# Patient Record
Sex: MALE | Race: WHITE | NOT HISPANIC OR LATINO | Employment: OTHER | URBAN - METROPOLITAN AREA
[De-identification: names, ages, dates, MRNs, and addresses within clinical notes are randomized per-mention and may not be internally consistent; named-entity substitution may affect disease eponyms.]

---

## 2017-01-27 ENCOUNTER — OFFICE VISIT (OUTPATIENT)
Dept: FAMILY MEDICINE CLINIC | Facility: CLINIC | Age: 52
End: 2017-01-27

## 2017-01-27 VITALS
HEART RATE: 74 BPM | HEIGHT: 73 IN | WEIGHT: 219 LBS | RESPIRATION RATE: 20 BRPM | DIASTOLIC BLOOD PRESSURE: 88 MMHG | BODY MASS INDEX: 29.03 KG/M2 | TEMPERATURE: 98.1 F | SYSTOLIC BLOOD PRESSURE: 134 MMHG | OXYGEN SATURATION: 99 %

## 2017-01-27 DIAGNOSIS — J02.9 SORE THROAT: Primary | ICD-10-CM

## 2017-01-27 DIAGNOSIS — J40 BRONCHITIS: ICD-10-CM

## 2017-01-27 DIAGNOSIS — J45.20 MILD INTERMITTENT ASTHMA WITHOUT COMPLICATION: ICD-10-CM

## 2017-01-27 LAB
EXPIRATION DATE: NORMAL
INTERNAL CONTROL: NORMAL
Lab: NORMAL
S PYO AG THROAT QL: NEGATIVE

## 2017-01-27 PROCEDURE — 87880 STREP A ASSAY W/OPTIC: CPT | Performed by: PHYSICIAN ASSISTANT

## 2017-01-27 PROCEDURE — 99213 OFFICE O/P EST LOW 20 MIN: CPT | Performed by: PHYSICIAN ASSISTANT

## 2017-01-27 RX ORDER — AZITHROMYCIN 250 MG/1
TABLET, FILM COATED ORAL
Qty: 6 TABLET | Refills: 0 | Status: SHIPPED | OUTPATIENT
Start: 2017-01-27 | End: 2017-07-24

## 2017-01-27 RX ORDER — PREDNISONE 20 MG/1
20 TABLET ORAL 2 TIMES DAILY
Qty: 10 TABLET | Refills: 0 | Status: SHIPPED | OUTPATIENT
Start: 2017-01-27 | End: 2017-07-24

## 2017-01-27 RX ORDER — TADALAFIL 5 MG
TABLET ORAL
Refills: 1 | COMMUNITY
Start: 2017-01-06 | End: 2017-06-30 | Stop reason: SDUPTHER

## 2017-01-27 RX ORDER — PSEUDOEPHEDRINE HCL 30 MG
TABLET ORAL
Qty: 30 TABLET | Refills: 1 | Status: SHIPPED | OUTPATIENT
Start: 2017-01-27 | End: 2017-07-24

## 2017-01-27 NOTE — MR AVS SNAPSHOT
Stephan Souzat   1/27/2017 9:15 AM   Office Visit    Dept Phone:  [phone]   Encounter #:  [id number]    Provider:  Rosana Chery PA-C   Department:  [facility] FAMILY MEDICINE                Your Full Care Plan              Today's Medication Changes          These changes are accurate as of: 1/27/17  9:56 AM.  If you have any questions, ask your nurse or doctor.               New Medication(s)Ordered:     azithromycin 250 MG tablet   Commonly known as:  ZITHROMAX Z-SHY   Take 2 tablets the first day, then 1 tablet daily for 4 days.   Started by:  Rosana Chery PA-C       predniSONE 20 MG tablet   Commonly known as:  DELTASONE   Take 1 tablet by mouth 2 (Two) Times a Day.   Started by:  Rosana Chery PA-C       pseudoephedrine 30 MG tablet   Commonly known as:  SUDAFED   Take one to two every 6 hrs as needed for congestion   Started by:  Rosana Chery PA-C         Stop taking medication(s)listed here:     VIAGRA 100 MG tablet   Generic drug:  sildenafil   Stopped by:  Rosana Chery PA-C                Where to Get Your Medications      These medications were sent to [address] [phone] [phone] [address]     Phone:  [phone]     azithromycin 250 MG tablet    predniSONE 20 MG tablet    pseudoephedrine 30 MG tablet                  Your Updated Medication List          This list is accurate as of: 1/27/17  9:56 AM.  Always use your most recent med list.                acyclovir 200 MG capsule   Commonly known as:  ZOVIRAX   Take 1 capsule by mouth 5 (Five) Times a Day.       azithromycin 250 MG tablet   Commonly known as:  ZITHROMAX Z-SHY   Take 2 tablets the first day, then 1 tablet daily for 4 days.       CIALIS 5 MG tablet   Generic drug:  tadalafil       escitalopram 20 MG tablet   Commonly known as:  LEXAPRO   Take 1 tablet by mouth  Daily.       predniSONE 20 MG tablet   Commonly known as:  DELTASONE   Take 1 tablet by mouth 2 (Two) Times a Day.       pseudoephedrine 30 MG tablet   Commonly known as:  SUDAFED   Take one to two every 6 hrs as needed for congestion       SYMBICORT 80-4.5 MCG/ACT inhaler   Generic drug:  budesonide-formoterol   Inhale 2 puffs 2 (Two) Times a Day.               We Performed the Following     POC Rapid Strep A       You Were Diagnosed With        Codes Comments    Sore throat    -  Primary ICD-10-CM: J02.9  ICD-9-CM: 462     Bronchitis     ICD-10-CM: J40  ICD-9-CM: 490       Instructions     None    Patient Instructions History      Upcoming Appointments     Visit Type Date Time Department    ACUTE           2017  9:15 AM [facility]      [email or web address] Signup     [email or web address] allows you to send messages to your doctor, view your test results, renew your prescriptions, schedule appointments, and more. To sign up, go to [employer] and click on the Sign Up Now link in the New User? box. Enter your [employer] Activation Code exactly as it appears below along with the last four digits of your Social Security Number and your Date of Birth () to complete the sign-up process. If you do not sign up before the expiration date, you must request a new code.    [email or web address] Activation Code: [email or web address]  Expires: 2/10/2017  9:56 AM    If you have questions, you can email [email or web address] or call [phone] to talk to our [email or web address] staff. Remember, [email or web address] is NOT to be used for urgent needs. For medical emergencies, dial 911.               Other Info from Your Visit           Allergies     Codeine      Levaquin [Levofloxacin]        Reason for Visit     Cough States symptoms started 2-3 days ago     Nasal Congestion           Vital Signs     Blood Pressure Pulse Temperature Respirations Height Weight    134/88 (BP Location: Left arm, Patient Position: Sitting) 74 98.1 °F (36.7 °C) (Oral)  "20 73\" (185.4 cm) 219 lb (99.3 kg)    Oxygen Saturation Body Mass Index Smoking Status             99% 28.89 kg/m2 Never Smoker         Problems and Diagnoses Noted     Sore throat    -  Primary    Bronchitis          Results     POC Rapid Strep A      Component Value Standard Range & Units    Rapid Strep A Screen Negative Negative, VALID, INVALID, Not Performed    Internal Control Passed Passed    Lot Number [id number]     Expiration Date 2018/4                     "

## 2017-01-27 NOTE — PROGRESS NOTES
Subjective   Stephan Bass is a 51 y.o. male    History of Present Illness  Patient comes in today complaining of new symptoms that started 2-3 days ago with sore throat and congestion associated with cough.  Patient does have history of asthma and is on Symbicort.  The following portions of the patient's history were reviewed and updated as appropriate: allergies, current medications, past social history and problem list    Review of Systems   Constitutional: Negative for fever.   HENT: Positive for postnasal drip, rhinorrhea, sneezing, sore throat and voice change. Negative for sinus pressure.    Respiratory: Positive for cough. Negative for chest tightness, shortness of breath and wheezing.        Objective     Vitals:    01/27/17 0916   BP: 134/88   Pulse: 74   Resp: 20   Temp: 98.1 °F (36.7 °C)   SpO2: 99%       Physical Exam   Constitutional: He appears well-developed and well-nourished. No distress.   HENT:   Head: Normocephalic and atraumatic.   Right Ear: External ear normal.   Left Ear: External ear normal.   Nose: Nose normal.   Mouth/Throat: Oropharynx is clear and moist. No oropharyngeal exudate.   Eyes: Conjunctivae are normal. Right eye exhibits no discharge. Left eye exhibits no discharge.   Neck: Normal range of motion. Neck supple.   Cardiovascular: Normal rate, regular rhythm and normal heart sounds.    Pulmonary/Chest: Effort normal and breath sounds normal. No respiratory distress.   Lymphadenopathy:     He has no cervical adenopathy.   Skin: Skin is warm and dry. He is not diaphoretic.   Nursing note and vitals reviewed.      Assessment/Plan     Diagnoses and all orders for this visit:    Sore throat  -     POC Rapid Strep A  -     pseudoephedrine (SUDAFED) 30 MG tablet; Take one to two every 6 hrs as needed for congestion    Bronchitis  -     azithromycin (ZITHROMAX Z-SHY) 250 MG tablet; Take 2 tablets the first day, then 1 tablet daily for 4 days.  -     predniSONE (DELTASONE) 20 MG tablet;  Take 1 tablet by mouth 2 (Two) Times a Day.    Mild intermittent asthma without complication    Other orders  -     CIALIS 5 MG tablet;

## 2017-06-27 RX ORDER — ESCITALOPRAM OXALATE 20 MG/1
20 TABLET ORAL DAILY
Qty: 90 TABLET | Refills: 1 | Status: SHIPPED | OUTPATIENT
Start: 2017-06-27 | End: 2017-11-19 | Stop reason: SDUPTHER

## 2017-06-30 RX ORDER — TADALAFIL 5 MG
TABLET ORAL
Qty: 90 TABLET | Refills: 1 | Status: SHIPPED | OUTPATIENT
Start: 2017-06-30 | End: 2017-07-24 | Stop reason: SDUPTHER

## 2017-07-24 ENCOUNTER — OFFICE VISIT (OUTPATIENT)
Dept: FAMILY MEDICINE CLINIC | Facility: CLINIC | Age: 52
End: 2017-07-24

## 2017-07-24 VITALS
DIASTOLIC BLOOD PRESSURE: 68 MMHG | HEART RATE: 86 BPM | RESPIRATION RATE: 14 BRPM | BODY MASS INDEX: 29.03 KG/M2 | WEIGHT: 220 LBS | OXYGEN SATURATION: 99 % | TEMPERATURE: 98.3 F | SYSTOLIC BLOOD PRESSURE: 114 MMHG

## 2017-07-24 DIAGNOSIS — N40.1 BENIGN NON-NODULAR PROSTATIC HYPERPLASIA WITH LOWER URINARY TRACT SYMPTOMS: ICD-10-CM

## 2017-07-24 DIAGNOSIS — J40 BRONCHITIS: Primary | ICD-10-CM

## 2017-07-24 DIAGNOSIS — N52.9 ERECTILE DYSFUNCTION, UNSPECIFIED ERECTILE DYSFUNCTION TYPE: ICD-10-CM

## 2017-07-24 DIAGNOSIS — J45.20 MILD INTERMITTENT ASTHMA WITHOUT COMPLICATION: ICD-10-CM

## 2017-07-24 PROCEDURE — 99213 OFFICE O/P EST LOW 20 MIN: CPT | Performed by: PHYSICIAN ASSISTANT

## 2017-07-24 RX ORDER — BENZONATATE 200 MG/1
200 CAPSULE ORAL 3 TIMES DAILY PRN
Qty: 21 CAPSULE | Refills: 0 | Status: SHIPPED | OUTPATIENT
Start: 2017-07-24 | End: 2017-10-25

## 2017-07-24 RX ORDER — CEFDINIR 300 MG/1
300 CAPSULE ORAL 2 TIMES DAILY
Qty: 14 CAPSULE | Refills: 0 | Status: SHIPPED | OUTPATIENT
Start: 2017-07-24 | End: 2017-10-25

## 2017-07-24 RX ORDER — TADALAFIL 5 MG
TABLET ORAL
Qty: 90 TABLET | Refills: 1 | Status: SHIPPED | OUTPATIENT
Start: 2017-07-24 | End: 2018-04-20 | Stop reason: SDUPTHER

## 2017-07-24 NOTE — PROGRESS NOTES
Subjective   Stephan Bass is a 52 y.o. male    History of Present Illness  Patient presents today for evaluation of ongoing, worsening symptoms of cough, postnasal drainage and sinus congestion.  He states that he does have a history of asthma which is very well controlled currently on Symbicort but has a history of developing bronchitis if symptoms persist.  He is having trouble sleeping at night because of the cough and having trouble during the daytime because when he starts talking the cough flares up.  He also needs a refill of his Cialis that he takes daily for erectile dysfunction and BPH symptoms.  He thinks he may need a prior authorization on this for his insurance.  The following portions of the patient's history were reviewed and updated as appropriate: allergies, current medications, past social history and problem list    Review of Systems   Constitutional: Negative for chills, fatigue and fever.   HENT: Positive for congestion and postnasal drip.    Respiratory: Positive for cough. Negative for chest tightness, shortness of breath and wheezing.    Cardiovascular: Negative for chest pain.   Gastrointestinal: Negative for nausea.   Genitourinary: Positive for difficulty urinating.        ED       Objective     Vitals:    07/24/17 1144   BP: 114/68   Pulse: 86   Resp: 14   Temp: 98.3 °F (36.8 °C)   SpO2: 99%       Physical Exam   Constitutional: He appears well-developed and well-nourished. No distress.   HENT:   Head: Normocephalic and atraumatic.   Right Ear: External ear normal.   Left Ear: External ear normal.   Nose: Nose normal.   Mouth/Throat: Oropharynx is clear and moist. No oropharyngeal exudate.   Eyes: Conjunctivae are normal. Right eye exhibits no discharge. Left eye exhibits no discharge.   Neck: Normal range of motion. Neck supple.   Cardiovascular: Normal rate, regular rhythm and normal heart sounds.    Pulmonary/Chest: Effort normal and breath sounds normal. No respiratory distress.    Lymphadenopathy:     He has no cervical adenopathy.   Skin: Skin is warm and dry. He is not diaphoretic.   Nursing note and vitals reviewed.      Assessment/Plan     Diagnoses and all orders for this visit:    Bronchitis    Erectile dysfunction, unspecified erectile dysfunction type  -     CIALIS 5 MG tablet; Take once daily    Benign non-nodular prostatic hyperplasia with lower urinary tract symptoms  -     CIALIS 5 MG tablet; Take once daily    Mild intermittent asthma without complication    Other orders  -     cefdinir (OMNICEF) 300 MG capsule; Take 1 capsule by mouth 2 (Two) Times a Day.  -     benzonatate (TESSALON) 200 MG capsule; Take 1 capsule by mouth 3 (Three) Times a Day As Needed for Cough.    Prescription written for Tussionex 1 teaspoon daily at bedtime dispensed 60 mL, abuse potential this medication discussed, drowsiness warning given.  Patient will use Tessalon Perles during the daytime, prescription given for Omnicef, refilled Cialis, follow-up as needed.

## 2017-08-31 RX ORDER — ACYCLOVIR 200 MG/1
CAPSULE ORAL
Qty: 50 CAPSULE | Refills: 11 | Status: SHIPPED | OUTPATIENT
Start: 2017-08-31 | End: 2017-09-22 | Stop reason: SDUPTHER

## 2017-09-22 RX ORDER — ACYCLOVIR 200 MG/1
200 CAPSULE ORAL DAILY
Qty: 90 CAPSULE | Refills: 3 | Status: SHIPPED | OUTPATIENT
Start: 2017-09-22 | End: 2018-04-20 | Stop reason: SDUPTHER

## 2017-10-25 ENCOUNTER — HOSPITAL ENCOUNTER (OUTPATIENT)
Dept: GENERAL RADIOLOGY | Facility: HOSPITAL | Age: 52
Discharge: HOME | End: 2017-10-25
Admitting: PHYSICIAN ASSISTANT

## 2017-10-25 ENCOUNTER — OFFICE VISIT (OUTPATIENT)
Dept: FAMILY MEDICINE CLINIC | Facility: CLINIC | Age: 52
End: 2017-10-25

## 2017-10-25 VITALS
TEMPERATURE: 98.5 F | BODY MASS INDEX: 29.53 KG/M2 | OXYGEN SATURATION: 100 % | WEIGHT: 222.8 LBS | RESPIRATION RATE: 14 BRPM | SYSTOLIC BLOOD PRESSURE: 136 MMHG | HEIGHT: 73 IN | HEART RATE: 74 BPM | DIASTOLIC BLOOD PRESSURE: 90 MMHG

## 2017-10-25 DIAGNOSIS — J18.9 COMMUNITY ACQUIRED PNEUMONIA OF RIGHT MIDDLE LOBE OF LUNG: Primary | ICD-10-CM

## 2017-10-25 PROCEDURE — 99213 OFFICE O/P EST LOW 20 MIN: CPT | Performed by: PHYSICIAN ASSISTANT

## 2017-10-25 PROCEDURE — 96372 THER/PROPH/DIAG INJ SC/IM: CPT | Performed by: PHYSICIAN ASSISTANT

## 2017-10-25 PROCEDURE — 71020 HC CHEST PA AND LATERAL: CPT

## 2017-10-25 RX ORDER — METHYLPREDNISOLONE 4 MG/1
TABLET ORAL
Refills: 0 | COMMUNITY
Start: 2017-10-22 | End: 2018-06-25

## 2017-10-25 RX ORDER — DOXYCYCLINE HYCLATE 100 MG/1
100 CAPSULE ORAL 2 TIMES DAILY
Qty: 20 CAPSULE | Refills: 0 | Status: SHIPPED | OUTPATIENT
Start: 2017-10-25 | End: 2018-06-25

## 2017-10-25 RX ORDER — CEFTRIAXONE 1 G/1
1 INJECTION, POWDER, FOR SOLUTION INTRAMUSCULAR; INTRAVENOUS EVERY 24 HOURS
Status: DISCONTINUED | OUTPATIENT
Start: 2017-10-25 | End: 2018-06-25

## 2017-10-25 RX ADMIN — CEFTRIAXONE 1 G: 1 INJECTION, POWDER, FOR SOLUTION INTRAMUSCULAR; INTRAVENOUS at 15:37

## 2017-10-25 NOTE — PROGRESS NOTES
Subjective   Stephan Bass is a 52 y.o. male    History of Present Illness  Patient comes in today stating that he is having ongoing symptoms of cough and rattling in his chest.  He has not run a fever.  He struggles sick over the weekend and went to [facility] was given a prescription of Omnicef that he started Sunday morning.  He was tested negative for the flu.  He states he is leaving town tomorrow evening for 3 days.  He had a hard time sleeping last night due to cough.  The following portions of the patient's history were reviewed and updated as appropriate: allergies, current medications, past social history and problem list    Review of Systems   Constitutional: Negative for chills, fatigue and fever.   HENT: Positive for congestion.    Respiratory: Positive for cough and wheezing. Negative for chest tightness and shortness of breath.    Cardiovascular: Negative for chest pain.   Gastrointestinal: Negative for nausea.       Objective     Vitals:    10/25/17 1459   BP: 136/90   Pulse: 74   Resp: 14   Temp: 98.5 °F (36.9 °C)   SpO2: 100%       Physical Exam   Constitutional: He appears well-developed and well-nourished. No distress.   HENT:   Head: Normocephalic and atraumatic.   Nose: Nose normal.   Mouth/Throat: Oropharynx is clear and moist.   Neck: Neck supple. No JVD present.   Cardiovascular: Normal rate, regular rhythm and normal heart sounds.    No murmur heard.  Pulmonary/Chest: Effort normal. No stridor. No respiratory distress. He has no wheezes. He has rales ( Right middle lobe).   Musculoskeletal: He exhibits no edema.   Lymphadenopathy:     He has no cervical adenopathy.   Skin: He is not diaphoretic.   Nursing note and vitals reviewed.      Assessment/Plan     Diagnoses and all orders for this visit:    Community acquired pneumonia of right middle lobe of lung  -     doxycycline (VIBRAMYCIN) 100 MG capsule; Take 1 capsule by mouth 2 (Two) Times a Day. With food  -     cefTRIAXone (ROCEPHIN) injection  1 g; Inject 1 g into the shoulder, thigh, or buttocks Daily.  -     XR Chest PA & Lateral    Other orders  -     MethylPREDNISolone (MEDROL, SHY,) 4 MG tablet; take as directed on pack  -     HYDROcod Polst-CPM Polst ER (TUSSIONEX PENNKINETIC) 10-8 MG/5ML ER suspension; take 1 teaspoonful by mouth at bedtime for cough    Discontinue Omnicef.  I will contact patient with chest x-ray report once I receive it.  Prescription written for Tussionex 1 teaspoon every 12 hours as needed for cough, 4 ounces to be dispensed, no refills, discussed abuse potential, [place] appropriate.

## 2017-12-05 RX ORDER — ESCITALOPRAM OXALATE 20 MG/1
TABLET ORAL
Qty: 90 TABLET | Refills: 1 | Status: SHIPPED | OUTPATIENT
Start: 2017-12-05 | End: 2018-08-22 | Stop reason: SDUPTHER

## 2018-04-09 ENCOUNTER — TELEPHONE (OUTPATIENT)
Dept: FAMILY MEDICINE CLINIC | Facility: CLINIC | Age: 53
End: 2018-04-09

## 2018-04-09 NOTE — TELEPHONE ENCOUNTER
----- Message from Barbara Burgos sent at 4/9/2018  3:49 PM EDT -----  Contact: PT.  Pt. SEE'S Dr. AKBAR.  Pt. IS NEEDING A REFILL ON EutechnyxA.  RX=JIGNA HERNANDEZ/DENISE Thomas.  Pt. CAN BE REACHED @ [facility].

## 2018-04-10 NOTE — TELEPHONE ENCOUNTER
Please call patient and see which he wants. He has taken daily Cialis 5mg and Viagra 100 mg prn in past.

## 2018-04-20 ENCOUNTER — TELEPHONE (OUTPATIENT)
Dept: FAMILY MEDICINE CLINIC | Facility: CLINIC | Age: 53
End: 2018-04-20

## 2018-04-20 DIAGNOSIS — N52.9 ERECTILE DYSFUNCTION, UNSPECIFIED ERECTILE DYSFUNCTION TYPE: ICD-10-CM

## 2018-04-20 DIAGNOSIS — N40.1 BENIGN NON-NODULAR PROSTATIC HYPERPLASIA WITH LOWER URINARY TRACT SYMPTOMS: ICD-10-CM

## 2018-04-20 RX ORDER — ACYCLOVIR 200 MG/1
200 CAPSULE ORAL EVERY 12 HOURS
Qty: 60 CAPSULE | Refills: 3 | Status: SHIPPED | OUTPATIENT
Start: 2018-04-20 | End: 2018-05-09 | Stop reason: SDUPTHER

## 2018-04-20 RX ORDER — TADALAFIL 5 MG
TABLET ORAL
Qty: 90 TABLET | Refills: 1 | Status: SHIPPED | OUTPATIENT
Start: 2018-04-20 | End: 2018-05-18 | Stop reason: SDUPTHER

## 2018-04-20 NOTE — TELEPHONE ENCOUNTER
----- Message from Padmini Conklin sent at 4/20/2018 11:05 AM EDT -----  Contact: patient  He needs a refill called in for: and said that he takes it 2XD:    acyclovir (ZOVIRAX) 200 MG capsule     Sig - Route:     [facility] - [facility] [address] [place] [address] [phone] [facility] [phone]

## 2018-05-09 ENCOUNTER — TELEPHONE (OUTPATIENT)
Dept: FAMILY MEDICINE CLINIC | Facility: CLINIC | Age: 53
End: 2018-05-09

## 2018-05-09 NOTE — TELEPHONE ENCOUNTER
----- Message from Alyssa Gray sent at 5/8/2018  9:39 AM EDT -----  Contact: PT   FOR EDUARDO ... WOULD LIKE A CALL BACK -[phone] REGARDING REFILL OF ACYCLOVIR, [facility]

## 2018-05-10 RX ORDER — ACYCLOVIR 200 MG/1
200 CAPSULE ORAL EVERY 12 HOURS
Qty: 180 CAPSULE | Refills: 1 | Status: SHIPPED | OUTPATIENT
Start: 2018-05-10 | End: 2018-05-18 | Stop reason: SDUPTHER

## 2018-05-18 ENCOUNTER — TELEPHONE (OUTPATIENT)
Dept: FAMILY MEDICINE CLINIC | Facility: CLINIC | Age: 53
End: 2018-05-18

## 2018-05-18 DIAGNOSIS — N40.1 BENIGN NON-NODULAR PROSTATIC HYPERPLASIA WITH LOWER URINARY TRACT SYMPTOMS: ICD-10-CM

## 2018-05-18 DIAGNOSIS — N52.9 ERECTILE DYSFUNCTION, UNSPECIFIED ERECTILE DYSFUNCTION TYPE: ICD-10-CM

## 2018-05-18 RX ORDER — TADALAFIL 5 MG
TABLET ORAL
Qty: 90 TABLET | Refills: 1 | Status: SHIPPED | OUTPATIENT
Start: 2018-05-18 | End: 2018-06-25 | Stop reason: SDUPTHER

## 2018-05-18 RX ORDER — ACYCLOVIR 200 MG/1
200 CAPSULE ORAL EVERY 12 HOURS
Qty: 180 CAPSULE | Refills: 1 | Status: SHIPPED | OUTPATIENT
Start: 2018-05-18 | End: 2018-06-01 | Stop reason: SDUPTHER

## 2018-05-18 NOTE — TELEPHONE ENCOUNTER
----- Message from Barbara Burgos sent at 5/18/2018  1:57 PM EDT -----  Contact: PT.  PT. IS NEEDING A REFILL'S ON: acyclovir (ZOVIRAX) 200 MG capsule 180 capsule 1 5/10/2018    Sig - Route: Take 1 capsule by mouth Every 12 (Twelve) Hours. - Oral     & CIALIS 5 MG tablet 90 tablet 1 4/20/2018    Sig: Take once daily     & VIAGARA 100 MG.    RX=[facility] - [address] - [phone]  - [phone] (Phone)  [phone] (Fax)    PT. CAN BE REACHED @ [facility] #.

## 2018-06-01 RX ORDER — ACYCLOVIR 200 MG/1
200 CAPSULE ORAL EVERY 12 HOURS
Qty: 180 CAPSULE | Refills: 1 | Status: SHIPPED | OUTPATIENT
Start: 2018-06-01 | End: 2018-11-01 | Stop reason: SDUPTHER

## 2018-06-25 ENCOUNTER — OFFICE VISIT (OUTPATIENT)
Dept: FAMILY MEDICINE CLINIC | Facility: CLINIC | Age: 53
End: 2018-06-25

## 2018-06-25 VITALS
TEMPERATURE: 98.1 F | SYSTOLIC BLOOD PRESSURE: 120 MMHG | BODY MASS INDEX: 29.03 KG/M2 | OXYGEN SATURATION: 98 % | HEIGHT: 73 IN | DIASTOLIC BLOOD PRESSURE: 80 MMHG | WEIGHT: 219 LBS | HEART RATE: 70 BPM

## 2018-06-25 DIAGNOSIS — F41.1 GENERALIZED ANXIETY DISORDER: Primary | ICD-10-CM

## 2018-06-25 DIAGNOSIS — N40.1 BENIGN NON-NODULAR PROSTATIC HYPERPLASIA WITH LOWER URINARY TRACT SYMPTOMS: ICD-10-CM

## 2018-06-25 DIAGNOSIS — N52.9 ERECTILE DYSFUNCTION, UNSPECIFIED ERECTILE DYSFUNCTION TYPE: ICD-10-CM

## 2018-06-25 PROCEDURE — 99214 OFFICE O/P EST MOD 30 MIN: CPT | Performed by: FAMILY MEDICINE

## 2018-06-25 RX ORDER — TADALAFIL 5 MG
TABLET ORAL
Qty: 90 TABLET | Refills: 3 | Status: SHIPPED | OUTPATIENT
Start: 2018-06-25 | End: 2021-09-28 | Stop reason: SDUPTHER

## 2018-06-25 RX ORDER — SILDENAFIL 100 MG/1
100 TABLET, FILM COATED ORAL AS NEEDED
COMMUNITY
End: 2018-06-25

## 2018-06-25 RX ORDER — BUSPIRONE HYDROCHLORIDE 15 MG/1
15 TABLET ORAL 2 TIMES DAILY PRN
Qty: 60 TABLET | Refills: 2 | Status: SHIPPED | OUTPATIENT
Start: 2018-06-25 | End: 2018-10-09 | Stop reason: HOSPADM

## 2018-06-25 NOTE — PROGRESS NOTES
Subjective   Stephan Bass is a 53 y.o. male    Patient presents today concerned about increased anxiety symptoms.  He is having some situations where his anxiety flares up in the says things that he should not say.  His 16-year-old son seems to bring out the worst in him at times.  Lexapro has worked well for general control of his anxiety symptoms but the situation seems to be aggravated at the present time.  25 minute appointment with greater than 50% time spent counseling patient.      Patient requests refill of his daily Cialis sent to his mail-order pharmacy.      Benign Prostatic Hypertrophy   Pertinent negatives include no nausea.       The following portions of the patient's history were reviewed and updated as appropriate: allergies, current medications, past social history and problem list    Review of Systems   Constitutional: Negative for appetite change and fatigue.   Respiratory: Negative for chest tightness and shortness of breath.    Gastrointestinal: Negative for abdominal pain, diarrhea and nausea.   Neurological: Negative for dizziness, tremors, weakness, light-headedness and headaches.   Psychiatric/Behavioral: Positive for dysphoric mood and sleep disturbance. Negative for agitation, behavioral problems, confusion, decreased concentration and suicidal ideas. The patient is nervous/anxious.        Objective     Vitals:    06/25/18 1552   BP: 120/80   Pulse: 70   Temp: 98.1 °F (36.7 °C)   SpO2: 98%       Physical Exam   Constitutional: He is oriented to person, place, and time. He appears well-developed and well-nourished.   HENT:   Head: Normocephalic and atraumatic.   Eyes: Conjunctivae are normal. Pupils are equal, round, and reactive to light.   Cardiovascular: Normal rate and regular rhythm.    Pulmonary/Chest: Effort normal and breath sounds normal.   Neurological: He is alert and oriented to person, place, and time.   Psychiatric: He has a normal mood and affect. His behavior is normal.  Judgment and thought content normal.   Nursing note and vitals reviewed.      Assessment/Plan   Problem List Items Addressed This Visit        Genitourinary    ED (erectile dysfunction)       Other    Generalized anxiety disorder - Primary    Relevant Medications    busPIRone (BUSPAR) 15 MG tablet      Other Visit Diagnoses     Benign non-nodular prostatic hyperplasia with lower urinary tract symptoms        Relevant Medications    sildenafil (VIAGRA) 100 MG tablet        Continue Lexapro as previously prescribed

## 2018-07-10 ENCOUNTER — TELEPHONE (OUTPATIENT)
Dept: FAMILY MEDICINE CLINIC | Facility: CLINIC | Age: 53
End: 2018-07-10

## 2018-07-10 NOTE — TELEPHONE ENCOUNTER
----- Message from Barbara Burgos sent at 7/10/2018 12:14 PM EDT -----  Contact: PT.  PT. CALLED, STATED HE JUST PHONED [facility]. CIALIS REFILLS/NOT RECEIVED YET.  [facility] STATED TO PT., THAT A PRIOR AUTH. IS REQUESTED TO OUR OFFICE & THAT NEEDING TO SUBMIT A QUANTITY CHANGE; DAILY IS THE ISSUE.  [facility] CAN BE REACHED @ [phone].  PT. STATED THIS MUST BE FOR MEDICAL PURPOSES.  PT. CAN BE REACHED @ [facility] #.

## 2018-07-10 NOTE — TELEPHONE ENCOUNTER
We haven't received anything regarding a PA but I did it on [email or web address] and med has been improved. Patient notified.

## 2018-08-01 ENCOUNTER — OFFICE VISIT (OUTPATIENT)
Dept: FAMILY MEDICINE CLINIC | Facility: CLINIC | Age: 53
End: 2018-08-01

## 2018-08-01 VITALS
WEIGHT: 220.8 LBS | OXYGEN SATURATION: 99 % | SYSTOLIC BLOOD PRESSURE: 120 MMHG | RESPIRATION RATE: 14 BRPM | HEIGHT: 73 IN | TEMPERATURE: 96.5 F | DIASTOLIC BLOOD PRESSURE: 70 MMHG | BODY MASS INDEX: 29.26 KG/M2 | HEART RATE: 74 BPM

## 2018-08-01 DIAGNOSIS — E78.5 HYPERLIPIDEMIA, UNSPECIFIED HYPERLIPIDEMIA TYPE: ICD-10-CM

## 2018-08-01 DIAGNOSIS — M54.10 RADICULAR LOW BACK PAIN: Primary | ICD-10-CM

## 2018-08-01 DIAGNOSIS — Z12.5 SPECIAL SCREENING FOR MALIGNANT NEOPLASM OF PROSTATE: ICD-10-CM

## 2018-08-01 DIAGNOSIS — R63.5 WEIGHT GAIN: ICD-10-CM

## 2018-08-01 DIAGNOSIS — R53.83 FATIGUE, UNSPECIFIED TYPE: ICD-10-CM

## 2018-08-01 DIAGNOSIS — Z12.11 COLON CANCER SCREENING: ICD-10-CM

## 2018-08-01 PROCEDURE — 99214 OFFICE O/P EST MOD 30 MIN: CPT | Performed by: PHYSICIAN ASSISTANT

## 2018-08-02 ENCOUNTER — LAB (OUTPATIENT)
Dept: LAB | Facility: HOSPITAL | Age: 53
End: 2018-08-02

## 2018-08-02 DIAGNOSIS — Z12.5 SPECIAL SCREENING FOR MALIGNANT NEOPLASM OF PROSTATE: ICD-10-CM

## 2018-08-02 DIAGNOSIS — R53.83 FATIGUE, UNSPECIFIED TYPE: ICD-10-CM

## 2018-08-02 DIAGNOSIS — R63.5 WEIGHT GAIN: ICD-10-CM

## 2018-08-02 LAB
ALBUMIN SERPL-MCNC: 4.38 G/DL (ref 3.2–4.8)
ALBUMIN/GLOB SERPL: 1.8 G/DL (ref 1.5–2.5)
ALP SERPL-CCNC: 68 U/L (ref 25–100)
ALT SERPL W P-5'-P-CCNC: 91 U/L (ref 7–40)
ANION GAP SERPL CALCULATED.3IONS-SCNC: 9 MMOL/L (ref 3–11)
ARTICHOKE IGE QN: 127 MG/DL (ref 0–130)
AST SERPL-CCNC: 56 U/L (ref 0–33)
BILIRUB SERPL-MCNC: 0.9 MG/DL (ref 0.3–1.2)
BUN BLD-MCNC: 16 MG/DL (ref 9–23)
BUN/CREAT SERPL: 14.2 (ref 7–25)
CALCIUM SPEC-SCNC: 9.4 MG/DL (ref 8.7–10.4)
CHLORIDE SERPL-SCNC: 106 MMOL/L (ref 99–109)
CHOLEST SERPL-MCNC: 189 MG/DL (ref 0–200)
CO2 SERPL-SCNC: 30 MMOL/L (ref 20–31)
CREAT BLD-MCNC: 1.13 MG/DL (ref 0.6–1.3)
DEPRECATED RDW RBC AUTO: 42.5 FL (ref 37–54)
ERYTHROCYTE [DISTWIDTH] IN BLOOD BY AUTOMATED COUNT: 13 % (ref 11.3–14.5)
GFR SERPL CREATININE-BSD FRML MDRD: 68 ML/MIN/1.73
GLOBULIN UR ELPH-MCNC: 2.4 GM/DL
GLUCOSE BLD-MCNC: 88 MG/DL (ref 70–100)
HBA1C MFR BLD: 5.4 % (ref 4.8–5.6)
HCT VFR BLD AUTO: 47.7 % (ref 38.9–50.9)
HDLC SERPL-MCNC: 50 MG/DL (ref 40–60)
HGB BLD-MCNC: 16.3 G/DL (ref 13.1–17.5)
MCH RBC QN AUTO: 31.1 PG (ref 27–31)
MCHC RBC AUTO-ENTMCNC: 34.2 G/DL (ref 32–36)
MCV RBC AUTO: 91 FL (ref 80–99)
PLATELET # BLD AUTO: 269 10*3/MM3 (ref 150–450)
PMV BLD AUTO: 10.5 FL (ref 6–12)
POTASSIUM BLD-SCNC: 4.3 MMOL/L (ref 3.5–5.5)
PROT SERPL-MCNC: 6.8 G/DL (ref 5.7–8.2)
PSA SERPL-MCNC: 0.39 NG/ML (ref 0–4)
RBC # BLD AUTO: 5.24 10*6/MM3 (ref 4.2–5.76)
SODIUM BLD-SCNC: 145 MMOL/L (ref 132–146)
TRIGL SERPL-MCNC: 95 MG/DL (ref 0–150)
TSH SERPL DL<=0.05 MIU/L-ACNC: 2.17 MIU/ML (ref 0.35–5.35)
WBC NRBC COR # BLD: 3.91 10*3/MM3 (ref 3.5–10.8)

## 2018-08-02 PROCEDURE — 84443 ASSAY THYROID STIM HORMONE: CPT

## 2018-08-02 PROCEDURE — 80061 LIPID PANEL: CPT | Performed by: PHYSICIAN ASSISTANT

## 2018-08-02 PROCEDURE — 85027 COMPLETE CBC AUTOMATED: CPT

## 2018-08-02 PROCEDURE — G0103 PSA SCREENING: HCPCS

## 2018-08-02 PROCEDURE — 36415 COLL VENOUS BLD VENIPUNCTURE: CPT

## 2018-08-02 PROCEDURE — 83036 HEMOGLOBIN GLYCOSYLATED A1C: CPT

## 2018-08-02 PROCEDURE — 80053 COMPREHEN METABOLIC PANEL: CPT

## 2018-08-03 NOTE — PROGRESS NOTES
Subjective   Stephan Bass is a 53 y.o. male  Numbness (L. arm x1-2 months); Back Pain (Needs referral to spine specialist.); and Excessive Sweating (Pt complains of feeling hot and sweating over the last six months especially when he exerts himself. )      History of Present Illness  Patient is a pleasant 51-year-old white male comes in complaining of low back pain that radiates to left leg he has a history of degenerative disc disease he states pain over the last 3-4 months is gotten worse she has numbness in his left leg has trouble getting on tiptoes pain when walking describes the pain is 9 out of 10.  Patient's had x-rays in the past 1 and MRI and referral to neurosurgeon    Patient complains of fatigue weight gain states she had trouble with fatigue throughout the last couple of weeks she states she has no energy he has not had blood work done in some time like his thyroid and blood count checked.  Patient has a history of increased cholesterol would like a lipid lab level.  Patient needs screening for colonoscopy and prostate cancer   patient's pain goes down right armThe following portions of the patient's history were reviewed and updated as appropriate: allergies, current medications, past social history and problem list    Review of Systems   Constitutional: Negative for appetite change, diaphoresis, fatigue and unexpected weight change.   Eyes: Negative for visual disturbance.   Respiratory: Negative for cough, chest tightness and shortness of breath.    Cardiovascular: Negative for chest pain, palpitations and leg swelling.   Gastrointestinal: Negative for diarrhea, nausea and vomiting.   Endocrine: Negative for polydipsia, polyphagia and polyuria.   Musculoskeletal: Positive for back pain.   Skin: Negative for color change and rash.   Neurological: Negative for dizziness, syncope, weakness, light-headedness, numbness and headaches.       Objective     Vitals:    08/01/18 1107   BP: 120/70   Pulse: 74    Resp: 14   Temp: 96.5 °F (35.8 °C)   SpO2: 99%       Physical Exam   Constitutional: He appears well-developed and well-nourished.   HENT:   Head: Normocephalic and atraumatic.   Right Ear: Tympanic membrane and ear canal normal.   Left Ear: Tympanic membrane and ear canal normal.   Nose: No mucosal edema, rhinorrhea or sinus tenderness. Right sinus exhibits no maxillary sinus tenderness and no frontal sinus tenderness. Left sinus exhibits no maxillary sinus tenderness and no frontal sinus tenderness.   Mouth/Throat: Oropharynx is clear and moist. No oropharyngeal exudate.   Eyes: Pupils are equal, round, and reactive to light.   Neck: Neck supple. No JVD present. No thyromegaly present.   Cardiovascular: Normal rate, regular rhythm, normal heart sounds, intact distal pulses and normal pulses.    No murmur heard.  Pulmonary/Chest: Effort normal and breath sounds normal. No respiratory distress.   Abdominal: Soft. Bowel sounds are normal. There is no hepatosplenomegaly. There is no tenderness.   Musculoskeletal: He exhibits no edema.        Lumbar back: He exhibits decreased range of motion, tenderness and bony tenderness.   Lymphadenopathy:     He has no cervical adenopathy.   Neurological: No sensory deficit.   Skin: Skin is warm and dry. He is not diaphoretic.   Nursing note and vitals reviewed.      Assessment/Plan     Diagnoses and all orders for this visit:    Radicular low back pain  -     MRI lumbar spine wo contrast; Future    Fatigue, unspecified type  -     Comprehensive Metabolic Panel; Future  -     Hemoglobin A1c; Future  -     CBC (No Diff); Future  -     TSH; Future    Weight gain  -     Comprehensive Metabolic Panel; Future  -     Hemoglobin A1c; Future  -     CBC (No Diff); Future  -     TSH; Future    Colon cancer screening  -     Ambulatory Referral For Screening Colonoscopy    Special screening for malignant neoplasm of prostate  -     PSA Screen; Future    Hyperlipidemia, unspecified  hyperlipidemia type  -     Lipid Panel    Follow-up after lab work is been completed

## 2018-08-06 ENCOUNTER — HOSPITAL ENCOUNTER (OUTPATIENT)
Dept: MRI IMAGING | Facility: HOSPITAL | Age: 53
Discharge: HOME | End: 2018-08-06
Admitting: PHYSICIAN ASSISTANT

## 2018-08-06 DIAGNOSIS — M54.10 RADICULAR LOW BACK PAIN: ICD-10-CM

## 2018-08-06 PROCEDURE — 72148 MRI LUMBAR SPINE W/O DYE: CPT

## 2018-08-14 DIAGNOSIS — Z12.11 SCREENING FOR COLON CANCER: Primary | ICD-10-CM

## 2018-08-15 ENCOUNTER — TRANSCRIBE ORDERS (OUTPATIENT)
Dept: FAMILY MEDICINE CLINIC | Facility: CLINIC | Age: 53
End: 2018-08-15

## 2018-08-15 DIAGNOSIS — M48.061 SPINAL STENOSIS OF LUMBAR REGION AT MULTIPLE LEVELS: Primary | ICD-10-CM

## 2018-08-16 ENCOUNTER — TELEPHONE (OUTPATIENT)
Dept: FAMILY MEDICINE CLINIC | Facility: CLINIC | Age: 53
End: 2018-08-16

## 2018-08-21 ENCOUNTER — OUTSIDE FACILITY SERVICE (OUTPATIENT)
Dept: GASTROENTEROLOGY | Facility: CLINIC | Age: 53
End: 2018-08-21

## 2018-08-21 PROCEDURE — G0500 MOD SEDAT ENDO SERVICE >5YRS: HCPCS | Performed by: INTERNAL MEDICINE

## 2018-08-21 PROCEDURE — G0105 COLORECTAL SCRN; HI RISK IND: HCPCS | Performed by: INTERNAL MEDICINE

## 2018-08-22 ENCOUNTER — TELEPHONE (OUTPATIENT)
Dept: FAMILY MEDICINE CLINIC | Facility: CLINIC | Age: 53
End: 2018-08-22

## 2018-08-22 RX ORDER — ESCITALOPRAM OXALATE 20 MG/1
20 TABLET ORAL DAILY
Qty: 90 TABLET | Refills: 1 | Status: SHIPPED | OUTPATIENT
Start: 2018-08-22 | End: 2019-02-13 | Stop reason: SDUPTHER

## 2018-08-22 NOTE — TELEPHONE ENCOUNTER
----- Message from Padmini Conklin sent at 8/22/2018  9:57 AM EDT -----  Contact: PATIENT  NEEDS A REFILL SENT IN FOR:    escitalopram (LEXAPRO) 20 MG tablet 90 tablet     Sig: TAKE 1 TABLET DAILY   Sent to pharmacy as: ESCITALOPRAM 20 MG PO tablet     [facility] - [facility], [place] [address] [phone] [facility] [phone] FX

## 2018-08-23 ENCOUNTER — TELEPHONE (OUTPATIENT)
Dept: GASTROENTEROLOGY | Facility: CLINIC | Age: 53
End: 2018-08-23

## 2018-08-23 NOTE — TELEPHONE ENCOUNTER
Patient called he states he has had extreme dizziness since his procedure. He is unable to turn his head, stand up, etc.. I told the patient to seek care today at his PCP, [facility], or the ER.

## 2018-08-24 ENCOUNTER — OFFICE VISIT (OUTPATIENT)
Dept: NEUROSURGERY | Facility: CLINIC | Age: 53
End: 2018-08-24

## 2018-08-24 VITALS — HEIGHT: 73 IN | WEIGHT: 220 LBS | TEMPERATURE: 98 F | BODY MASS INDEX: 29.16 KG/M2

## 2018-08-24 DIAGNOSIS — M51.36 DEGENERATIVE DISC DISEASE, LUMBAR: ICD-10-CM

## 2018-08-24 DIAGNOSIS — M43.16 SPONDYLOLISTHESIS OF LUMBAR REGION: ICD-10-CM

## 2018-08-24 DIAGNOSIS — M48.062 SPINAL STENOSIS OF LUMBAR REGION WITH NEUROGENIC CLAUDICATION: Primary | ICD-10-CM

## 2018-08-24 DIAGNOSIS — M47.816 FACET ARTHRITIS OF LUMBAR REGION: ICD-10-CM

## 2018-08-24 DIAGNOSIS — M51.36 BULGING LUMBAR DISC: ICD-10-CM

## 2018-08-24 PROCEDURE — 99243 OFF/OP CNSLTJ NEW/EST LOW 30: CPT | Performed by: NEUROLOGICAL SURGERY

## 2018-08-24 RX ORDER — PREDNISONE 5 MG/1
TABLET ORAL
COMMUNITY
Start: 2018-08-23 | End: 2018-10-09 | Stop reason: HOSPADM

## 2018-08-24 RX ORDER — POLYETHYLENE GLYCOL-3350 AND ELECTROLYTES 236; 6.74; 5.86; 2.97; 22.74 G/274.31G; G/274.31G; G/274.31G; G/274.31G; G/274.31G
POWDER, FOR SOLUTION ORAL
Refills: 0 | COMMUNITY
Start: 2018-08-14 | End: 2018-10-09 | Stop reason: HOSPADM

## 2018-08-24 RX ORDER — FLUTICASONE PROPIONATE 50 MCG
SPRAY, SUSPENSION (ML) NASAL AS NEEDED
COMMUNITY
Start: 2018-08-23

## 2018-08-24 NOTE — PROGRESS NOTES
Patient: Stephan Bass  : 1965    Primary Care Provider: Luke Potts MD    Requesting Provider: As above        History    Chief Complaint: Back pain with walking and standing intolerance.    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  He stopped playing soccer and stopped running.  He's done some stretching.  He has ongoing low back pain.  If he stands or walks for any length of time and then he gets numbness and tingling in his legs.  His back pain also increases.  He has no leg pain.  His symptoms are better with sitting, lying down, or flexing far forward at the waist.  He reports no bowel or bladder dysfunction.    Review of Systems   Constitutional: Negative for activity change, appetite change, chills, diaphoresis, fatigue, fever and unexpected weight change.   HENT: Positive for congestion and ear pain. Negative for dental problem, drooling, ear discharge, facial swelling, hearing loss, mouth sores, nosebleeds, postnasal drip, rhinorrhea, sinus pressure, sneezing, sore throat, tinnitus, trouble swallowing and voice change.    Eyes: Negative for photophobia, pain, discharge, redness, itching and visual disturbance.   Respiratory: Positive for apnea. Negative for cough, choking, chest tightness, shortness of breath, wheezing and stridor.    Cardiovascular: Negative for chest pain, palpitations and leg swelling.   Gastrointestinal: Negative for abdominal distention, abdominal pain, anal bleeding, blood in stool, constipation, diarrhea, nausea, rectal pain and vomiting.   Endocrine: Negative for cold intolerance, heat intolerance, polydipsia, polyphagia and polyuria.   Genitourinary: Negative for decreased urine volume, difficulty urinating, dysuria, enuresis, flank pain, frequency, genital sores, hematuria and urgency.   Musculoskeletal: Positive for  "back pain. Negative for arthralgias, gait problem, joint swelling, myalgias, neck pain and neck stiffness.   Skin: Negative for color change, pallor, rash and wound.   Allergic/Immunologic: Negative for environmental allergies, food allergies and immunocompromised state.   Neurological: Positive for dizziness and numbness. Negative for tremors, seizures, syncope, facial asymmetry, speech difficulty, weakness, light-headedness and headaches.   Hematological: Negative for adenopathy. Does not bruise/bleed easily.   Psychiatric/Behavioral: Negative for agitation, behavioral problems, confusion, decreased concentration, dysphoric mood, hallucinations, self-injury, sleep disturbance and suicidal ideas. The patient is nervous/anxious. The patient is not hyperactive.    All other systems reviewed and are negative.      The patient's past medical history, past surgical history, family history, and social history have been reviewed at length in the electronic medical record.    Physical Exam:   Temp 98 °F (36.7 °C) (Temporal Artery )   Ht 185.4 cm (73\")   Wt 99.8 kg (220 lb)   BMI 29.03 kg/m²   CONSTITUTIONAL: Patient is well-nourished, pleasant and appears stated age.  CV: Heart regular rate and rhythm without murmur, rub, or gallop.  PULMONARY: Lungs are clear to ascultation.  MUSCULOSKELETAL:  Straight leg raising is negative.  Navi's Sign is negative.  ROM in back is normal.  Tenderness in the back to palpation is not observed.  NEUROLOGICAL:  Orientation, memory, attention span, language function, and cognition have been examined and are intact.  Strength is intact in the lower extremities to direct testing.  Muscle tone is normal throughout.  Station and gait are normal.  Sensation is intact to light touch testing throughout.  Deep tendon reflexes are 1+ and symmetrical.  Coordination is intact.      Medical Decision Making    Data Review:   MRI of the lumbar spine dated 8/6/18 demonstrates diffuse degenerative " "disc disease and facet arthropathy.  There is at least moderate stenosis at L3-4.  At L4-5 there is generous stenosis with a grade 1 spondylolisthesis.  Bilateral joint effusions are noted at L4-5.    Diagnosis:   1.  Lumbar stenosis with neurogenic claudication.  2.  L4-5 spondylolisthesis with probable instability.  3.  Lumbar degenerative disc disease.  4.  Lumbar degenerative joint disease.    Treatment Options:   While there is no complete \"fix\" for his difficulties we probably can help him substantially with some of his pain and with his walking and standing intolerance.  I have recommended lumbar CT myelogram for surgical planning purposes.       Diagnosis Plan   1. Spinal stenosis of lumbar region with neurogenic claudication     2. Spondylolisthesis of lumbar region     3. Bulging lumbar disc     4. Degenerative disc disease, lumbar     5. Facet arthritis of lumbar region (CMS/[facility])         Scribed for Greg Cardenas MD by Susan Jerez [facility] on 08/24/2018 at 11:40 AM    I, Dr. Cardenas, personally performed the services described in the documentation, as scribed in my presence, and it is both accurate and complete.  "

## 2018-09-07 ENCOUNTER — APPOINTMENT (OUTPATIENT)
Dept: CT IMAGING | Facility: HOSPITAL | Age: 53
End: 2018-09-07

## 2018-09-07 ENCOUNTER — HOSPITAL ENCOUNTER (EMERGENCY)
Facility: HOSPITAL | Age: 53
Discharge: HOME | End: 2018-09-08
Attending: EMERGENCY MEDICINE | Admitting: EMERGENCY MEDICINE

## 2018-09-07 VITALS
TEMPERATURE: 98.6 F | SYSTOLIC BLOOD PRESSURE: 124 MMHG | DIASTOLIC BLOOD PRESSURE: 73 MMHG | BODY MASS INDEX: 28.89 KG/M2 | HEIGHT: 73 IN | RESPIRATION RATE: 18 BRPM | WEIGHT: 218 LBS | OXYGEN SATURATION: 95 % | HEART RATE: 71 BPM
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
## 2018-09-07 DIAGNOSIS — S39.012A STRAIN OF LUMBAR REGION, INITIAL ENCOUNTER: ICD-10-CM

## 2018-09-07 PROCEDURE — 72131 CT LUMBAR SPINE W/O DYE: CPT

## 2018-09-07 PROCEDURE — 99283 EMERGENCY DEPT VISIT LOW MDM: CPT

## 2018-09-07 RX ORDER — FEXOFENADINE HCL 180 MG/1
180 TABLET ORAL DAILY
COMMUNITY
End: 2020-08-18 | Stop reason: ALTCHOICE

## 2018-09-07 RX ORDER — SODIUM CHLORIDE 0.9 % (FLUSH) 0.9 %
10 SYRINGE (ML) INJECTION AS NEEDED
Status: DISCONTINUED | OUTPATIENT
Start: 2018-09-07 | End: 2018-09-08 | Stop reason: HOSPADM

## 2018-09-07 RX ORDER — HYDROCODONE BITARTRATE AND ACETAMINOPHEN 5; 325 MG/1; MG/1
2 TABLET ORAL ONCE
Status: COMPLETED | OUTPATIENT
Start: 2018-09-07 | End: 2018-09-07

## 2018-09-07 RX ORDER — ONDANSETRON 2 MG/ML
4 INJECTION INTRAMUSCULAR; INTRAVENOUS ONCE
Status: DISCONTINUED | OUTPATIENT
Start: 2018-09-07 | End: 2018-09-08 | Stop reason: HOSPADM

## 2018-09-07 RX ADMIN — HYDROCODONE BITARTRATE AND ACETAMINOPHEN 2 TABLET: 5; 325 TABLET ORAL at 23:11

## 2018-09-08 RX ORDER — NAPROXEN 500 MG/1
500 TABLET ORAL 2 TIMES DAILY PRN
Qty: 20 TABLET | Refills: 0 | Status: SHIPPED | OUTPATIENT
Start: 2018-09-08 | End: 2018-10-09 | Stop reason: HOSPADM

## 2018-09-08 RX ORDER — CYCLOBENZAPRINE HCL 5 MG
5 TABLET ORAL 3 TIMES DAILY PRN
Qty: 20 TABLET | Refills: 0 | Status: SHIPPED | OUTPATIENT
Start: 2018-09-08 | End: 2018-10-09 | Stop reason: HOSPADM

## 2018-09-08 RX ORDER — HYDROCODONE BITARTRATE AND ACETAMINOPHEN 5; 325 MG/1; MG/1
1 TABLET ORAL EVERY 6 HOURS PRN
Qty: 12 TABLET | Refills: 0 | Status: SHIPPED | OUTPATIENT
Start: 2018-09-08 | End: 2018-10-09 | Stop reason: HOSPADM

## 2018-09-08 NOTE — DISCHARGE INSTRUCTIONS
Follow up with your primary care provider when you return from [place].    Return to the ER if you develop any acute or worsening symptoms.

## 2018-10-09 ENCOUNTER — PREP FOR SURGERY (OUTPATIENT)
Dept: OTHER | Facility: HOSPITAL | Age: 53
End: 2018-10-09

## 2018-10-09 ENCOUNTER — OFFICE VISIT (OUTPATIENT)
Dept: NEUROSURGERY | Facility: CLINIC | Age: 53
End: 2018-10-09

## 2018-10-09 ENCOUNTER — HOSPITAL ENCOUNTER (OUTPATIENT)
Dept: GENERAL RADIOLOGY | Facility: HOSPITAL | Age: 53
Discharge: HOME | End: 2018-10-09

## 2018-10-09 ENCOUNTER — HOSPITAL ENCOUNTER (OUTPATIENT)
Dept: CT IMAGING | Facility: HOSPITAL | Age: 53
Discharge: HOME | End: 2018-10-09

## 2018-10-09 ENCOUNTER — HOSPITAL ENCOUNTER (OUTPATIENT)
Dept: GENERAL RADIOLOGY | Facility: HOSPITAL | Age: 53
Discharge: HOME | End: 2018-10-09
Attending: NEUROLOGICAL SURGERY | Admitting: NEUROLOGICAL SURGERY

## 2018-10-09 VITALS — BODY MASS INDEX: 29.72 KG/M2 | HEIGHT: 73 IN | TEMPERATURE: 97.7 F | WEIGHT: 224.2 LBS | RESPIRATION RATE: 17 BRPM

## 2018-10-09 VITALS
RESPIRATION RATE: 18 BRPM | DIASTOLIC BLOOD PRESSURE: 95 MMHG | OXYGEN SATURATION: 97 % | HEART RATE: 63 BPM | TEMPERATURE: 97.6 F | WEIGHT: 222 LBS | BODY MASS INDEX: 29.42 KG/M2 | SYSTOLIC BLOOD PRESSURE: 138 MMHG | HEIGHT: 73 IN

## 2018-10-09 DIAGNOSIS — Z98.890 S/P MYELOGRAM: Primary | ICD-10-CM

## 2018-10-09 DIAGNOSIS — M47.816 FACET ARTHRITIS OF LUMBAR REGION: ICD-10-CM

## 2018-10-09 DIAGNOSIS — M48.062 SPINAL STENOSIS OF LUMBAR REGION WITH NEUROGENIC CLAUDICATION: ICD-10-CM

## 2018-10-09 DIAGNOSIS — M43.16 SPONDYLOLISTHESIS OF LUMBAR REGION: ICD-10-CM

## 2018-10-09 DIAGNOSIS — M54.16 LUMBAR RADICULOPATHY: ICD-10-CM

## 2018-10-09 DIAGNOSIS — M51.36 DEGENERATIVE DISC DISEASE, LUMBAR: ICD-10-CM

## 2018-10-09 DIAGNOSIS — M48.062 LUMBAR STENOSIS WITH NEUROGENIC CLAUDICATION: Primary | ICD-10-CM

## 2018-10-09 PROCEDURE — 99214 OFFICE O/P EST MOD 30 MIN: CPT | Performed by: NEUROLOGICAL SURGERY

## 2018-10-09 PROCEDURE — 72120 X-RAY BEND ONLY L-S SPINE: CPT

## 2018-10-09 PROCEDURE — 72132 CT LUMBAR SPINE W/DYE: CPT

## 2018-10-09 PROCEDURE — 72240 MYELOGRAPHY NECK SPINE: CPT

## 2018-10-09 PROCEDURE — 0 IOPAMIDOL 41 % SOLUTION: Performed by: NEUROLOGICAL SURGERY

## 2018-10-09 PROCEDURE — 62304 MYELOGRAPHY LUMBAR INJECTION: CPT

## 2018-10-09 RX ORDER — LIDOCAINE HYDROCHLORIDE 10 MG/ML
10 INJECTION, SOLUTION INFILTRATION; PERINEURAL ONCE
Status: COMPLETED | OUTPATIENT
Start: 2018-10-09 | End: 2018-10-09

## 2018-10-09 RX ORDER — ACETAMINOPHEN 500 MG
1000 TABLET ORAL ONCE
Status: CANCELLED | OUTPATIENT
Start: 2018-10-09 | End: 2018-10-09

## 2018-10-09 RX ORDER — PROMETHAZINE HYDROCHLORIDE 25 MG/ML
25 INJECTION, SOLUTION INTRAMUSCULAR; INTRAVENOUS ONCE AS NEEDED
Status: DISCONTINUED | OUTPATIENT
Start: 2018-10-09 | End: 2018-10-10 | Stop reason: HOSPADM

## 2018-10-09 RX ORDER — IBUPROFEN 800 MG/1
800 TABLET ORAL ONCE
Status: CANCELLED | OUTPATIENT
Start: 2018-10-09 | End: 2018-10-09

## 2018-10-09 RX ORDER — ONDANSETRON 4 MG/1
4 TABLET, FILM COATED ORAL ONCE AS NEEDED
Status: DISCONTINUED | OUTPATIENT
Start: 2018-10-09 | End: 2018-10-10 | Stop reason: HOSPADM

## 2018-10-09 RX ORDER — CEFAZOLIN SODIUM 2 G/100ML
2 INJECTION, SOLUTION INTRAVENOUS ONCE
Status: CANCELLED | OUTPATIENT
Start: 2018-10-09 | End: 2018-10-09

## 2018-10-09 RX ORDER — SODIUM CHLORIDE 0.9 % (FLUSH) 0.9 %
3 SYRINGE (ML) INJECTION EVERY 12 HOURS SCHEDULED
Status: CANCELLED | OUTPATIENT
Start: 2018-10-09

## 2018-10-09 RX ORDER — SODIUM CHLORIDE 0.9 % (FLUSH) 0.9 %
1-10 SYRINGE (ML) INJECTION AS NEEDED
Status: CANCELLED | OUTPATIENT
Start: 2018-10-09

## 2018-10-09 RX ORDER — FAMOTIDINE 20 MG/1
20 TABLET, FILM COATED ORAL
Status: CANCELLED | OUTPATIENT
Start: 2018-10-09

## 2018-10-09 RX ORDER — PROMETHAZINE HYDROCHLORIDE 25 MG/1
25 TABLET ORAL ONCE AS NEEDED
Status: DISCONTINUED | OUTPATIENT
Start: 2018-10-09 | End: 2018-10-10 | Stop reason: HOSPADM

## 2018-10-09 RX ORDER — OXYCODONE HCL 10 MG/1
10 TABLET, FILM COATED, EXTENDED RELEASE ORAL ONCE
Status: CANCELLED | OUTPATIENT
Start: 2018-10-09 | End: 2018-10-09

## 2018-10-09 RX ADMIN — IOPAMIDOL 20 ML: 408 INJECTION, SOLUTION INTRATHECAL at 07:15

## 2018-10-09 RX ADMIN — LIDOCAINE HYDROCHLORIDE 10 ML: 10 INJECTION, SOLUTION INFILTRATION; PERINEURAL at 07:15

## 2018-10-09 NOTE — POST-PROCEDURE NOTE
MYELOGRAM PROCEDURE NOTE  Neurosurgery    Patient: Stephan Bass  : 1965      PreOp Diagnosis: Lumbar stenosis    PostOp Diagnosis: Same    Procedure: Lumbar myelogram    Surgeon: Ronald    Anesthesia: 1% lidocaine    Technique:   Spinal needle: 22 gauge   Contrast: Isovue 200 (20ml)   Injection site: Midline L2-3    EBL: Trace    Specimens removed: None    Complication: None        Greg Cardenas MD  10/09/18  7:18 AM

## 2018-10-09 NOTE — H&P
Patient: Stephan Bass  : 1965     Primary Care Provider: Luke Potts MD     Requesting Provider: As above           History     Chief Complaint: Back pain with walking and standing intolerance.     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  In  he was diagnosed with lumbar stenosis and degenerative disc disease.  He stopped playing soccer and stopped running.  He's done some stretching.  He has ongoing low back pain.  If he stands or walks for any length of time and then he gets numbness and tingling in his legs.  His back pain also increases.  He has no leg pain.  His symptoms are better with sitting, lying down, or flexing far forward at the waist.  He reports no bowel or bladder dysfunction.        Review of Systems   Constitutional: Negative for activity change, appetite change, chills, diaphoresis, fatigue, fever and unexpected weight change.   HENT: Negative for congestion, dental problem, drooling, ear discharge, ear pain, facial swelling, hearing loss, mouth sores, nosebleeds, postnasal drip, rhinorrhea, sinus pressure, sneezing, sore throat, tinnitus, trouble swallowing and voice change.    Eyes: Negative for photophobia, pain, discharge, redness, itching and visual disturbance.   Respiratory: Negative for apnea, cough, choking, chest tightness, shortness of breath, wheezing and stridor.    Cardiovascular: Negative for chest pain, palpitations and leg swelling.   Gastrointestinal: Negative for abdominal distention, abdominal pain, anal bleeding, blood in stool, constipation, diarrhea, nausea, rectal pain and vomiting.   Endocrine: Negative for cold intolerance, heat intolerance, polydipsia, polyphagia and polyuria.   Genitourinary: Negative for decreased urine volume, difficulty urinating, dysuria, enuresis, flank pain, frequency, genital sores, hematuria and urgency.   Musculoskeletal: Positive for back pain. Negative  for arthralgias, gait problem, joint swelling, myalgias, neck pain and neck stiffness.   Skin: Negative for color change, pallor, rash and wound.   Allergic/Immunologic: Negative for environmental allergies, food allergies and immunocompromised state.   Neurological: Negative for dizziness, tremors, seizures, syncope, facial asymmetry, speech difficulty, weakness, light-headedness, numbness and headaches.   Hematological: Negative for adenopathy. Does not bruise/bleed easily.   Psychiatric/Behavioral: Negative for agitation, behavioral problems, confusion, decreased concentration, dysphoric mood, hallucinations, self-injury, sleep disturbance and suicidal ideas. The patient is not nervous/anxious and is not hyperactive.    All other systems reviewed and are negative.        The patient's past medical history, past surgical history, family history, and social history have been reviewed at length in the electronic medical record.     Past Medical History:   Diagnosis Date   • Acute bronchitis    • Acute maxillary sinusitis    • Allergic    • Allergic rhinitis    • Asthma    • BPH (benign prostatic hyperplasia)    • Bronchospasm, acute    • Bulging lumbar disc    • Cough    • Degenerative disc disease, lumbar    • Depression    • Sleep apnea     wears appliance in mouth and snore guard     Past Surgical History:   Procedure Laterality Date   • HERNIA REPAIR     • VASECTOMY       Family History   Problem Relation Age of Onset   • Adopted: Yes     Social History     Social History   • Marital status:      Spouse name: N/A   • Number of children: N/A   • Years of education: N/A     Occupational History   • Not on file.     Social History Main Topics   • Smoking status: Never Smoker   • Smokeless tobacco: Never Used   • Alcohol use Yes      Comment: Occasionally   • Drug use: No   • Sexual activity: Yes     Partners: Female      Comment:       Other Topics Concern   • Not on file     Social History Narrative  "  • No narrative on file     Allergies   Allergen Reactions   • Codeine Hives   • Levaquin [Levofloxacin] Hives       Physical Exam:   Temp 97.7 °F (36.5 °C) (Temporal Artery )   Resp 17   Ht 185.4 cm (73\")   Wt 102 kg (224 lb 3.2 oz)   BMI 29.58 kg/m²   MUSCULOSKELETAL:  Straight leg raising is negative.  Navi's Sign is negative.  ROM in back is normal.  Tenderness in the back to palpation is not observed.  NEUROLOGICAL:  Strength is intact in the lower extremities to direct testing.  Muscle tone is normal throughout.  Station and gait are normal.  Sensation is intact to light touch testing throughout.        Medical Decision Making     Data Review:   CT myelogram demonstrates some mild bilateral root compromise at L3-4.  However, there is a complete block at the L4-5 level.  The grade 1 listhesis at L4-5 reduces somewhat in extension but incompletely.     Diagnosis:   1.  Lumbar stenosis with neurogenic claudication.  2.  L4-5 spondylolisthesis and instability.     Treatment Options:   I have recommended L4-5 decompression and PLIF.  I would also pursue bilateral L3-4 foraminotomies.  Surgery will likely improve his claudication type symptoms.  It will not necessarily eradicate all of his pain in his back some of which is mechanical.  The nature of the procedure as well as the potential risks, complications, limitations, and alternatives to the procedure were discussed at length with the patient and the patient has agreed to proceed with surgery.          Diagnosis Plan   1. S/P myelogram      2. Spondylolisthesis of lumbar region      3. Spinal stenosis of lumbar region with neurogenic claudication      4. Degenerative disc disease, lumbar      5. Facet arthritis of lumbar region (CMS/HCC)      6. Lumbar radiculopathy           "

## 2018-10-09 NOTE — PROGRESS NOTES
Patient: Stephan Bass  : 1965    Primary Care Provider: Luke Potts MD    Requesting Provider: As above        History    Chief Complaint: Back pain with walking and standing intolerance.    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  He stopped playing soccer and stopped running.  He's done some stretching.  He has ongoing low back pain.  If he stands or walks for any length of time and then he gets numbness and tingling in his legs.  His back pain also increases.  He has no leg pain.  His symptoms are better with sitting, lying down, or flexing far forward at the waist.  He reports no bowel or bladder dysfunction.       Review of Systems   Constitutional: Negative for activity change, appetite change, chills, diaphoresis, fatigue, fever and unexpected weight change.   HENT: Negative for congestion, dental problem, drooling, ear discharge, ear pain, facial swelling, hearing loss, mouth sores, nosebleeds, postnasal drip, rhinorrhea, sinus pressure, sneezing, sore throat, tinnitus, trouble swallowing and voice change.    Eyes: Negative for photophobia, pain, discharge, redness, itching and visual disturbance.   Respiratory: Negative for apnea, cough, choking, chest tightness, shortness of breath, wheezing and stridor.    Cardiovascular: Negative for chest pain, palpitations and leg swelling.   Gastrointestinal: Negative for abdominal distention, abdominal pain, anal bleeding, blood in stool, constipation, diarrhea, nausea, rectal pain and vomiting.   Endocrine: Negative for cold intolerance, heat intolerance, polydipsia, polyphagia and polyuria.   Genitourinary: Negative for decreased urine volume, difficulty urinating, dysuria, enuresis, flank pain, frequency, genital sores, hematuria and urgency.   Musculoskeletal: Positive for back pain. Negative for  "arthralgias, gait problem, joint swelling, myalgias, neck pain and neck stiffness.   Skin: Negative for color change, pallor, rash and wound.   Allergic/Immunologic: Negative for environmental allergies, food allergies and immunocompromised state.   Neurological: Negative for dizziness, tremors, seizures, syncope, facial asymmetry, speech difficulty, weakness, light-headedness, numbness and headaches.   Hematological: Negative for adenopathy. Does not bruise/bleed easily.   Psychiatric/Behavioral: Negative for agitation, behavioral problems, confusion, decreased concentration, dysphoric mood, hallucinations, self-injury, sleep disturbance and suicidal ideas. The patient is not nervous/anxious and is not hyperactive.    All other systems reviewed and are negative.      The patient's past medical history, past surgical history, family history, and social history have been reviewed at length in the electronic medical record.    Physical Exam:   Temp 97.7 °F (36.5 °C) (Temporal Artery )   Resp 17   Ht 185.4 cm (73\")   Wt 102 kg (224 lb 3.2 oz)   BMI 29.58 kg/m²   MUSCULOSKELETAL:  Straight leg raising is negative.  Navi's Sign is negative.  ROM in back is normal.  Tenderness in the back to palpation is not observed.  NEUROLOGICAL:  Strength is intact in the lower extremities to direct testing.  Muscle tone is normal throughout.  Station and gait are normal.  Sensation is intact to light touch testing throughout.      Medical Decision Making    Data Review:   CT myelogram demonstrates some mild bilateral root compromise at L3-4.  However, there is a complete block at the L4-5 level.  The grade 1 listhesis at L4-5 reduces somewhat in extension but incompletely.    Diagnosis:   1.  Lumbar stenosis with neurogenic claudication.  2.  L4-5 spondylolisthesis and instability.    Treatment Options:   I have recommended L4-5 decompression and PLIF.  I would also pursue bilateral L3-4 foraminotomies.  Surgery will likely " improve his claudication type symptoms.  It will not necessarily eradicate all of his pain in his back some of which is mechanical.  The nature of the procedure as well as the potential risks, complications, limitations, and alternatives to the procedure were discussed at length with the patient and the patient has agreed to proceed with surgery.       Diagnosis Plan   1. S/P myelogram     2. Spondylolisthesis of lumbar region     3. Spinal stenosis of lumbar region with neurogenic claudication     4. Degenerative disc disease, lumbar     5. Facet arthritis of lumbar region (CMS/HCC)     6. Lumbar radiculopathy         Scribed for Greg Cardenas MD by Emily Carrion CMA on 10/9/2018 12:18 PM       I, Dr. Cardenas, personally performed the services described in the documentation, as scribed in my presence, and it is both accurate and complete.

## 2018-10-09 NOTE — NURSING NOTE
Pt discharged from IR dept s/p myelogram.  Pt tolerated procedure without complications.  Discharge instructions reviewed with patient who verbalized understanding.  PT transported to exit via wheelchair per CNA.

## 2018-10-10 ENCOUNTER — TELEPHONE (OUTPATIENT)
Dept: INFUSION THERAPY | Facility: HOSPITAL | Age: 53
End: 2018-10-10

## 2018-11-06 RX ORDER — ACYCLOVIR 200 MG/1
CAPSULE ORAL
Qty: 180 CAPSULE | Refills: 1 | Status: SHIPPED | OUTPATIENT
Start: 2018-11-06 | End: 2019-05-20 | Stop reason: SDUPTHER

## 2019-02-13 RX ORDER — ESCITALOPRAM OXALATE 20 MG/1
TABLET ORAL
Qty: 90 TABLET | Refills: 1 | Status: SHIPPED | OUTPATIENT
Start: 2019-02-13 | End: 2019-03-08 | Stop reason: SDUPTHER

## 2019-02-19 ENCOUNTER — OFFICE VISIT (OUTPATIENT)
Dept: FAMILY MEDICINE CLINIC | Facility: CLINIC | Age: 54
End: 2019-02-19

## 2019-02-19 VITALS
RESPIRATION RATE: 16 BRPM | BODY MASS INDEX: 30.03 KG/M2 | OXYGEN SATURATION: 99 % | HEIGHT: 73 IN | DIASTOLIC BLOOD PRESSURE: 78 MMHG | SYSTOLIC BLOOD PRESSURE: 128 MMHG | TEMPERATURE: 98 F | WEIGHT: 226.6 LBS | HEART RATE: 75 BPM

## 2019-02-19 DIAGNOSIS — R20.0 LEFT ARM NUMBNESS: Primary | ICD-10-CM

## 2019-02-19 PROCEDURE — 99213 OFFICE O/P EST LOW 20 MIN: CPT | Performed by: PHYSICIAN ASSISTANT

## 2019-02-19 NOTE — PROGRESS NOTES
Subjective   Stephan Bass is a 53 y.o. male  Lump (Tender lump located in Lt armpit x6 months)      History of Present Illness  Patient is a 53-year-old white male comes in plan of left armpit pain left arm numbness ×6 months he states when he pushes on the inside of the left arm his arm goes numb he has decreased  strength numbness and weakness has been getting worse over the last 6 months no injury or trauma patient states there is a lump in his left axilla that is causing the numbness  The following portions of the patient's history were reviewed and updated as appropriate: allergies, current medications, past social history and problem list    Review of Systems   Constitutional: Negative for appetite change, diaphoresis, fatigue and unexpected weight change.   Eyes: Negative for visual disturbance.   Respiratory: Negative for cough, chest tightness and shortness of breath.    Cardiovascular: Negative for chest pain, palpitations and leg swelling.   Gastrointestinal: Negative for diarrhea, nausea and vomiting.   Endocrine: Negative for polydipsia, polyphagia and polyuria.   Skin: Negative for color change and rash.   Neurological: Negative for dizziness, syncope, weakness, light-headedness, numbness and headaches.       Objective     Vitals:    02/19/19 1149   BP: 128/78   Pulse: 75   Resp: 16   Temp: 98 °F (36.7 °C)   SpO2: 99%       Physical Exam   Constitutional: He appears well-developed and well-nourished.   Neck: Neck supple. No JVD present. No thyromegaly present.   Cardiovascular: Normal rate, regular rhythm, normal heart sounds and intact distal pulses.   Pulmonary/Chest: Effort normal and breath sounds normal.   Abdominal: Soft. Bowel sounds are normal.   Musculoskeletal: He exhibits no edema.        Arms:  Lymphadenopathy:     He has no cervical adenopathy.   Neurological: No sensory deficit.   Skin: Skin is warm and dry. He is not diaphoretic.   Nursing note and vitals  reviewed.      Assessment/Plan     Diagnoses and all orders for this visit:    Left arm numbness  -     Nerve Conduction Test 1 - 2; Future    #1 nerve conduction study      Follow-up after nerve conduction studies been completed

## 2019-03-08 RX ORDER — ESCITALOPRAM OXALATE 20 MG/1
TABLET ORAL
Qty: 30 TABLET | Refills: 11 | Status: SHIPPED | OUTPATIENT
Start: 2019-03-08 | End: 2020-03-02

## 2019-04-09 PROCEDURE — 87340 HEPATITIS B SURFACE AG IA: CPT | Performed by: NURSE PRACTITIONER

## 2019-04-09 PROCEDURE — 87350 HEPATITIS BE AG IA: CPT | Performed by: NURSE PRACTITIONER

## 2019-04-09 PROCEDURE — G0432 EIA HIV-1/HIV-2 SCREEN: HCPCS | Performed by: NURSE PRACTITIONER

## 2019-04-09 PROCEDURE — 86707 HEPATITIS BE ANTIBODY: CPT | Performed by: NURSE PRACTITIONER

## 2019-04-09 PROCEDURE — 86705 HEP B CORE ANTIBODY IGM: CPT | Performed by: NURSE PRACTITIONER

## 2019-04-09 PROCEDURE — 86592 SYPHILIS TEST NON-TREP QUAL: CPT | Performed by: NURSE PRACTITIONER

## 2019-04-09 PROCEDURE — 86803 HEPATITIS C AB TEST: CPT | Performed by: NURSE PRACTITIONER

## 2019-04-12 ENCOUNTER — TELEPHONE (OUTPATIENT)
Dept: URGENT CARE | Facility: CLINIC | Age: 54
End: 2019-04-12

## 2019-05-15 ENCOUNTER — TELEPHONE (OUTPATIENT)
Dept: FAMILY MEDICINE CLINIC | Facility: CLINIC | Age: 54
End: 2019-05-15

## 2019-05-22 RX ORDER — ACYCLOVIR 200 MG/1
CAPSULE ORAL
Qty: 180 CAPSULE | Refills: 1 | Status: SHIPPED | OUTPATIENT
Start: 2019-05-22 | End: 2019-09-19 | Stop reason: SDUPTHER

## 2019-06-03 ENCOUNTER — TELEPHONE (OUTPATIENT)
Dept: FAMILY MEDICINE CLINIC | Facility: CLINIC | Age: 54
End: 2019-06-03

## 2019-06-03 DIAGNOSIS — G56.92 NEUROPATHY, ARM, LEFT: Primary | ICD-10-CM

## 2019-06-03 DIAGNOSIS — G89.29 CHRONIC WRIST PAIN, LEFT: ICD-10-CM

## 2019-06-03 DIAGNOSIS — M25.532 CHRONIC WRIST PAIN, LEFT: ICD-10-CM

## 2019-06-03 NOTE — TELEPHONE ENCOUNTER
NCV/EMG was ordered at his last office visit but he was never contacted about having it done. Can you order it again?

## 2019-06-03 NOTE — TELEPHONE ENCOUNTER
----- Message from Fátima Velazquez sent at 6/3/2019  4:08 PM EDT -----  Patient wants to know what to do about his neuropathy located in left arm and has a knot under left arm pit? Said Tomasz was supposed to let him to the next plan of care? He can be reached at [phone]..  ThanksFátima..

## 2019-07-09 ENCOUNTER — TELEPHONE (OUTPATIENT)
Dept: FAMILY MEDICINE CLINIC | Facility: CLINIC | Age: 54
End: 2019-07-09

## 2019-07-09 ENCOUNTER — HOSPITAL ENCOUNTER (OUTPATIENT)
Dept: NEUROLOGY | Facility: HOSPITAL | Age: 54
Discharge: HOME | End: 2019-07-09
Admitting: PHYSICIAN ASSISTANT

## 2019-07-09 PROCEDURE — 95886 MUSC TEST DONE W/N TEST COMP: CPT

## 2019-07-09 PROCEDURE — 95908 NRV CNDJ TST 3-4 STUDIES: CPT

## 2019-07-09 NOTE — TELEPHONE ENCOUNTER
"----- Message from Barbara KULWINDER Aubrey sent at 7/9/2019  2:17 PM EDT -----  Contact: PT.  PT. SEE'S SHIRA.  PT. WANTING TO IDENTIFY WHAT THE \"LUMP\" IS; UNDER LEFT ARMPIT; WAS NOT THERE 1 YR. AGO.   PT. ORDERED A NERVE TEST & WE SHOULD HAVE THIS RESULT BACK.  PT. JUST DID TODAY.  PT. IS WANTING A BIOPSY OR A SCAN TO FIND OUT WHAT IS GOING ON.  WHAT IS NEXT PLAN OF CARE?    PT. CAN BE REACHED @ [facility] #.    "

## 2019-07-17 NOTE — TELEPHONE ENCOUNTER
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

## 2019-07-23 ENCOUNTER — OFFICE VISIT (OUTPATIENT)
Dept: FAMILY MEDICINE CLINIC | Facility: CLINIC | Age: 54
End: 2019-07-23

## 2019-07-23 VITALS
HEART RATE: 67 BPM | WEIGHT: 222 LBS | BODY MASS INDEX: 29.42 KG/M2 | RESPIRATION RATE: 14 BRPM | HEIGHT: 73 IN | DIASTOLIC BLOOD PRESSURE: 82 MMHG | OXYGEN SATURATION: 98 % | SYSTOLIC BLOOD PRESSURE: 122 MMHG

## 2019-07-23 DIAGNOSIS — R22.32 MASS OF LEFT AXILLA: Primary | ICD-10-CM

## 2019-07-23 PROCEDURE — 99213 OFFICE O/P EST LOW 20 MIN: CPT | Performed by: PHYSICIAN ASSISTANT

## 2019-07-23 RX ORDER — ALBUTEROL SULFATE 90 UG/1
AEROSOL, METERED RESPIRATORY (INHALATION)
Refills: 1 | COMMUNITY
Start: 2019-05-03

## 2019-07-23 NOTE — PROGRESS NOTES
Subjective   Stephan Bass is a 54 y.o. male  Lump (F/U. Located in Lt armpit, occ causes numbness in arm. Had NCV/EMG)      History of Present Illness  Patient is a pleasant 54-year-old white male who comes in complaining of numbness and a lump under his left axilla patient states that the numbness in his left arm is still present he had a nerve conduction study which showed little neuropathy of his ulnar nerve.  The following portions of the patient's history were reviewed and updated as appropriate: allergies, current medications, past social history and problem list    Review of Systems   Constitutional: Negative for appetite change, diaphoresis, fatigue and unexpected weight change.   Eyes: Negative for visual disturbance.   Respiratory: Negative for cough, chest tightness and shortness of breath.    Cardiovascular: Negative for chest pain, palpitations and leg swelling.   Gastrointestinal: Negative for diarrhea, nausea and vomiting.   Endocrine: Negative for polydipsia, polyphagia and polyuria.   Skin: Negative for color change and rash.   Neurological: Negative for dizziness, syncope, weakness, light-headedness, numbness and headaches.       Objective     Vitals:    07/23/19 0935   BP: 122/82   Pulse: 67   Resp: 14   SpO2: 98%       Physical Exam   Constitutional: He appears well-developed and well-nourished.   Neck: Neck supple. No JVD present. No thyromegaly present.   Cardiovascular: Normal rate, regular rhythm, normal heart sounds, intact distal pulses and normal pulses.   No murmur heard.  Pulmonary/Chest: Effort normal and breath sounds normal. No respiratory distress.   Abdominal: Soft. Bowel sounds are normal. There is no hepatosplenomegaly. There is no tenderness.   Musculoskeletal: He exhibits no edema.        Arms:  Lymphadenopathy:     He has no cervical adenopathy.   Neurological: No sensory deficit.   Skin: Skin is warm and dry. He is not diaphoretic.   Nursing note and vitals  reviewed.      Assessment/Plan     Diagnoses and all orders for this visit:    Mass of left axilla  -     Ambulatory Referral to General Surgery    Other orders  -     albuterol sulfate  (90 Base) MCG/ACT inhaler; inhale 2 puffs by mouth every 4 hours if needed MAY USE 2 PUFFS 2...  (REFER TO PRESCRIPTION NOTES).    Patient would like to be referred to a surgeon for evaluation of this fullness/mass in his left axilla he has had no symptoms such as weight loss fatigue night sweats.

## 2019-08-02 DIAGNOSIS — N52.9 ERECTILE DYSFUNCTION, UNSPECIFIED ERECTILE DYSFUNCTION TYPE: ICD-10-CM

## 2019-08-02 DIAGNOSIS — N40.1 BENIGN NON-NODULAR PROSTATIC HYPERPLASIA WITH LOWER URINARY TRACT SYMPTOMS: ICD-10-CM

## 2019-08-09 RX ORDER — TADALAFIL 5 MG
TABLET ORAL
Qty: 90 TABLET | Refills: 1 | Status: SHIPPED | OUTPATIENT
Start: 2019-08-09 | End: 2021-09-28 | Stop reason: SDUPTHER

## 2019-08-29 ENCOUNTER — OFFICE VISIT (OUTPATIENT)
Dept: FAMILY MEDICINE CLINIC | Facility: CLINIC | Age: 54
End: 2019-08-29

## 2019-08-29 VITALS
SYSTOLIC BLOOD PRESSURE: 134 MMHG | WEIGHT: 222.6 LBS | TEMPERATURE: 98.1 F | BODY MASS INDEX: 29.5 KG/M2 | DIASTOLIC BLOOD PRESSURE: 72 MMHG | OXYGEN SATURATION: 99 % | HEART RATE: 63 BPM | HEIGHT: 73 IN

## 2019-08-29 DIAGNOSIS — H81.12 BENIGN PAROXYSMAL POSITIONAL VERTIGO OF LEFT EAR: Primary | ICD-10-CM

## 2019-08-29 DIAGNOSIS — H69.92 DISORDER OF LEFT EUSTACHIAN TUBE: ICD-10-CM

## 2019-08-29 PROCEDURE — 99214 OFFICE O/P EST MOD 30 MIN: CPT | Performed by: PHYSICIAN ASSISTANT

## 2019-08-29 RX ORDER — PREDNISONE 20 MG/1
20 TABLET ORAL DAILY
Qty: 10 TABLET | Refills: 0 | Status: SHIPPED | OUTPATIENT
Start: 2019-08-29 | End: 2020-08-18

## 2019-08-29 RX ORDER — MECLIZINE HYDROCHLORIDE 25 MG/1
25 TABLET ORAL 3 TIMES DAILY PRN
Qty: 15 TABLET | Refills: 0 | Status: SHIPPED | OUTPATIENT
Start: 2019-08-29 | End: 2020-08-18

## 2019-08-29 NOTE — PROGRESS NOTES
Subjective   Stephan Bass is a 54 y.o. male  Dizziness (Increased dizziness and vertigo x2 days )      History of Present Illness  Patient comes in today concerned with increased dizziness and vertigo for the past 2 days.  He states that started couple of nights ago and has been intermittent but Brian occurred 3 times today.  The following portions of the patient's history were reviewed and updated as appropriate: allergies, current medications, past social history and problem list    Review of Systems   Constitutional: Negative for activity change and fever.   HENT: Positive for dental problem ( Recent dental work done on left upper gumline). Negative for ear pain.    Respiratory: Negative for chest tightness and shortness of breath.    Cardiovascular: Negative for chest pain and palpitations.   Gastrointestinal: Negative for abdominal pain, diarrhea, nausea and vomiting.   Genitourinary: Negative for difficulty urinating and dysuria.   Neurological: Positive for dizziness. Negative for seizures, syncope, facial asymmetry, weakness and numbness.   Psychiatric/Behavioral: Negative.        Objective     Vitals:    08/29/19 1135   BP: 134/72   Pulse: 63   Temp: 98.1 °F (36.7 °C)   SpO2: 99%       Physical Exam   Constitutional: He is oriented to person, place, and time. He appears well-developed and well-nourished. No distress.   HENT:   Head: Normocephalic and atraumatic.   Right Ear: Hearing and tympanic membrane normal.   Left Ear: Hearing normal. A middle ear effusion is present.   Eyes: Pupils are equal, round, and reactive to light.   Neck: Normal carotid pulses present. Carotid bruit is not present.   Cardiovascular: Normal rate, regular rhythm and normal heart sounds.   Pulmonary/Chest: Effort normal and breath sounds normal. No respiratory distress.   Musculoskeletal: Normal range of motion.   Neurological: He is alert and oriented to person, place, and time. He displays normal reflexes. No cranial nerve  deficit. He exhibits normal muscle tone. Coordination normal.   Skin: He is not diaphoretic.   Psychiatric: He has a normal mood and affect. His behavior is normal. Judgment and thought content normal.   Nursing note and vitals reviewed.      Assessment/Plan     Diagnoses and all orders for this visit:    Benign paroxysmal positional vertigo of left ear    Disorder of left eustachian tube    Other orders  -     predniSONE (DELTASONE) 20 MG tablet; Take 1 tablet by mouth Daily.  -     meclizine (ANTIVERT) 25 MG tablet; Take 1 tablet by mouth 3 (Three) Times a Day As Needed for dizziness.

## 2019-09-25 RX ORDER — ACYCLOVIR 200 MG/1
CAPSULE ORAL
Qty: 180 CAPSULE | Refills: 1 | Status: SHIPPED | OUTPATIENT
Start: 2019-09-25 | End: 2020-03-31

## 2019-10-25 ENCOUNTER — TELEPHONE (OUTPATIENT)
Dept: FAMILY MEDICINE CLINIC | Facility: CLINIC | Age: 54
End: 2019-10-25

## 2019-10-25 NOTE — TELEPHONE ENCOUNTER
----- Message from Barbara Burgos sent at 10/21/2019 12:40 PM EDT -----  Contact: PT.  PT. IS NEEDING A PRIOR AUTH. ON HIS CIALIS 5 MG tablet 90 tablet 1 8/9/2019    Sig: take 1 tablet by mouth once daily   Sent to pharmacy as: Cialis 5 MG Oral Tablet     PT. IS WANTING A #90 DAY SUPPLY.    RX=[facility] [place] [address] - [place], [place] - [address] [place] [address] - [phone]  - [phone] (Phone)  [phone] (Fax)    RX STATED FAXED OVER/NO REPLY FROM OFFICE.    PT. CAN BE REACHED @ [facility] #.

## 2020-03-02 RX ORDER — ESCITALOPRAM OXALATE 20 MG/1
TABLET ORAL
Qty: 360 TABLET | Refills: 0 | Status: SHIPPED | OUTPATIENT
Start: 2020-03-02 | End: 2020-03-31

## 2020-03-31 RX ORDER — ESCITALOPRAM OXALATE 20 MG/1
TABLET ORAL
Qty: 360 TABLET | Refills: 0 | Status: SHIPPED | OUTPATIENT
Start: 2020-03-31 | End: 2020-08-18

## 2020-03-31 RX ORDER — ACYCLOVIR 200 MG/1
CAPSULE ORAL
Qty: 180 CAPSULE | Refills: 1 | Status: SHIPPED | OUTPATIENT
Start: 2020-03-31 | End: 2020-09-21

## 2020-08-18 ENCOUNTER — OFFICE VISIT (OUTPATIENT)
Dept: FAMILY MEDICINE CLINIC | Facility: CLINIC | Age: 55
End: 2020-08-18

## 2020-08-18 VITALS
WEIGHT: 226.2 LBS | HEIGHT: 73 IN | HEART RATE: 61 BPM | RESPIRATION RATE: 14 BRPM | BODY MASS INDEX: 29.98 KG/M2 | OXYGEN SATURATION: 100 % | TEMPERATURE: 97.3 F | SYSTOLIC BLOOD PRESSURE: 138 MMHG | DIASTOLIC BLOOD PRESSURE: 70 MMHG

## 2020-08-18 DIAGNOSIS — S39.012A STRAIN OF LUMBAR REGION, INITIAL ENCOUNTER: ICD-10-CM

## 2020-08-18 DIAGNOSIS — F32.0 CURRENT MILD EPISODE OF MAJOR DEPRESSIVE DISORDER WITHOUT PRIOR EPISODE (HCC): Primary | ICD-10-CM

## 2020-08-18 PROCEDURE — 99214 OFFICE O/P EST MOD 30 MIN: CPT | Performed by: PHYSICIAN ASSISTANT

## 2020-08-18 RX ORDER — METHYLPREDNISOLONE 4 MG/1
TABLET ORAL
Qty: 21 TABLET | Refills: 1 | Status: SHIPPED | OUTPATIENT
Start: 2020-08-18 | End: 2021-09-28

## 2020-08-18 RX ORDER — DESVENLAFAXINE SUCCINATE 50 MG/1
50 TABLET, EXTENDED RELEASE ORAL DAILY
Qty: 30 TABLET | Refills: 11 | Status: SHIPPED | OUTPATIENT
Start: 2020-08-18 | End: 2021-09-07

## 2020-08-18 RX ORDER — LEVOCETIRIZINE DIHYDROCHLORIDE 5 MG/1
5 TABLET, FILM COATED ORAL EVERY EVENING
COMMUNITY
End: 2021-09-28

## 2020-08-18 RX ORDER — CELECOXIB 200 MG/1
200 CAPSULE ORAL DAILY
Qty: 30 CAPSULE | Refills: 1 | Status: SHIPPED | OUTPATIENT
Start: 2020-08-18 | End: 2021-01-11

## 2020-08-18 RX ORDER — CYCLOBENZAPRINE HCL 10 MG
10 TABLET ORAL 2 TIMES DAILY PRN
Qty: 30 TABLET | Refills: 1 | Status: SHIPPED | OUTPATIENT
Start: 2020-08-18 | End: 2021-09-28

## 2020-08-18 NOTE — PROGRESS NOTES
I have reviewed the notes, assessments, and/or procedures performed by Tomasz Vincent PA-C, I concur with his documentation of Stephan Bass.

## 2020-08-18 NOTE — PROGRESS NOTES
Subjective   Stephan Bass is a 55 y.o. male  Anxiety (Wants to discuss side effects of Lexapro-poss med change) and Back Pain (Low back/flank x1 day after doing yardwork)      History of Present Illness  Patient is a pleasant 55-year-old white male who comes in complaining of acute low back pain patient was doing some yard work felt something pull in his lower back he states he has had large amount of pain in lower lumbar area with no pain radiating to either leg he states pain is localized describes pain sharp stabbing 9 out of 10 no over-the-counter medications working for pain relief has been trying to put ice but without much relief    Patient complains of anxiety depression states Lexapro is no longer working like to try something else he has to with mood swings anger issues he denies any SI/HI states he has trouble with focus and his anxiety is no longer controlled on Lexapro 20 mg this is a chronic issue that is not stable  The following portions of the patient's history were reviewed and updated as appropriate: allergies, current medications, past social history and problem list    Review of Systems   Constitutional: Negative for appetite change, diaphoresis, fatigue and unexpected weight change.   Eyes: Negative for visual disturbance.   Respiratory: Negative for cough, chest tightness and shortness of breath.    Cardiovascular: Negative for chest pain, palpitations and leg swelling.   Gastrointestinal: Negative for diarrhea, nausea and vomiting.   Endocrine: Negative for polydipsia, polyphagia and polyuria.   Musculoskeletal: Positive for back pain.   Skin: Negative for color change and rash.   Neurological: Negative for dizziness, syncope, weakness, light-headedness, numbness and headaches.       Objective     Vitals:    08/18/20 1309   BP: 138/70   Pulse: 61   Resp: 14   Temp: 97.3 °F (36.3 °C)   SpO2: 100%       Physical Exam   Constitutional: He appears well-developed and well-nourished.   Neck: Neck  supple. No JVD present. No thyromegaly present.   Cardiovascular: Normal rate, regular rhythm, normal heart sounds, intact distal pulses and normal pulses.   No murmur heard.  Pulmonary/Chest: Effort normal and breath sounds normal. No respiratory distress.   Abdominal: Soft. Bowel sounds are normal. There is no hepatosplenomegaly. There is no tenderness.   Musculoskeletal: He exhibits no edema.        Lumbar back: He exhibits decreased range of motion, tenderness and bony tenderness.   Lymphadenopathy:     He has no cervical adenopathy.   Neurological: No sensory deficit.   Skin: Skin is warm and dry. He is not diaphoretic.   Nursing note and vitals reviewed.      Assessment/Plan     Stephan was seen today for anxiety and back pain.    Diagnoses and all orders for this visit:    Current mild episode of major depressive disorder without prior episode (CMS/[facility])  -     cyclobenzaprine (FLEXERIL) 10 MG tablet; Take 1 tablet by mouth 2 (Two) Times a Day As Needed for Muscle Spasms.    Strain of lumbar region, initial encounter  -     desvenlafaxine (Pristiq) 50 MG 24 hr tablet; Take 1 tablet by mouth Daily.  -     methylPREDNISolone (MEDROL) 4 MG dose pack; Take as directed on package instructions.  -     celecoxib (CeleBREX) 200 MG capsule; Take 1 capsule by mouth Daily.    .  #1 stop Lexapro start Pristiq 50 mg 1 p.o. every day dispense 30    2.  Celebrex 200 mg 1 p.o. every day, Flexeril 10 mg 1 p.o. nightly spent 30    Medrol Dosepak    Follow-up if no better

## 2020-09-21 RX ORDER — ACYCLOVIR 200 MG/1
CAPSULE ORAL
Qty: 180 CAPSULE | Refills: 1 | Status: SHIPPED | OUTPATIENT
Start: 2020-09-21 | End: 2021-01-18 | Stop reason: SDUPTHER

## 2020-09-21 RX ORDER — TADALAFIL 5 MG
TABLET ORAL
Qty: 90 TABLET | Refills: 3 | Status: SHIPPED | OUTPATIENT
Start: 2020-09-21 | End: 2021-10-04

## 2021-01-10 DIAGNOSIS — S39.012A STRAIN OF LUMBAR REGION, INITIAL ENCOUNTER: ICD-10-CM

## 2021-01-11 RX ORDER — CELECOXIB 200 MG/1
200 CAPSULE ORAL DAILY
Qty: 90 CAPSULE | Refills: 1 | Status: SHIPPED | OUTPATIENT
Start: 2021-01-11 | End: 2021-07-09

## 2021-01-18 DIAGNOSIS — B00.1 RECURRENT COLD SORES: Primary | ICD-10-CM

## 2021-01-18 RX ORDER — ACYCLOVIR 200 MG/1
200 CAPSULE ORAL EVERY 12 HOURS
Qty: 180 CAPSULE | Refills: 1 | Status: CANCELLED | OUTPATIENT
Start: 2021-01-18

## 2021-01-18 RX ORDER — ACYCLOVIR 200 MG/1
200 CAPSULE ORAL EVERY 12 HOURS
Qty: 180 CAPSULE | Refills: 1 | Status: SHIPPED | OUTPATIENT
Start: 2021-01-18 | End: 2021-07-09

## 2021-01-18 NOTE — TELEPHONE ENCOUNTER
PT CALLED REQUESTING A REFILL FOR:  acyclovir (ZOVIRAX) 200 MG capsule    [facility] #[id number] - [place], [place] - [address] - [phone]  - [phone] FX     PT HAS TWO DAYS LEFT

## 2021-07-09 DIAGNOSIS — S39.012A STRAIN OF LUMBAR REGION, INITIAL ENCOUNTER: ICD-10-CM

## 2021-07-09 DIAGNOSIS — B00.1 RECURRENT COLD SORES: ICD-10-CM

## 2021-07-09 RX ORDER — CELECOXIB 200 MG/1
200 CAPSULE ORAL DAILY
Qty: 90 CAPSULE | Refills: 1 | Status: SHIPPED | OUTPATIENT
Start: 2021-07-09 | End: 2021-09-28

## 2021-07-09 RX ORDER — ACYCLOVIR 200 MG/1
CAPSULE ORAL
Qty: 180 CAPSULE | Refills: 1 | Status: SHIPPED | OUTPATIENT
Start: 2021-07-09 | End: 2021-12-23

## 2021-07-15 NOTE — TELEPHONE ENCOUNTER
Caller: Stephan Bass    Relationship: Self    Best call back number: [phone]    Medication needed:   Requested Prescriptions     Pending Prescriptions Disp Refills   • Symbicort 80-4.5 MCG/ACT inhaler       Sig: Inhale 2 puffs.       When do you need the refill by: 07/15/2021    What additional details did the patient provide when requesting the medication: PATIENT IS OUT OF MEDICATION     Does the patient have less than a 3 day supply:  [x] Yes  [] No    What is the patient's preferred pharmacy: [facility] #[id number] - [place] [address] [facility] AT [facility] [address] & [facility] [phone] [facility] [phone] FX

## 2021-07-16 RX ORDER — DILTIAZEM HYDROCHLORIDE 60 MG/1
TABLET, FILM COATED ORAL
Qty: 10.2 G | Refills: 3 | Status: SHIPPED | OUTPATIENT
Start: 2021-07-16

## 2021-09-07 DIAGNOSIS — S39.012A STRAIN OF LUMBAR REGION, INITIAL ENCOUNTER: ICD-10-CM

## 2021-09-07 RX ORDER — DESVENLAFAXINE SUCCINATE 50 MG/1
50 TABLET, EXTENDED RELEASE ORAL DAILY
Qty: 30 TABLET | Refills: 11 | OUTPATIENT
Start: 2021-09-07

## 2021-09-07 RX ORDER — DESVENLAFAXINE SUCCINATE 50 MG/1
50 TABLET, EXTENDED RELEASE ORAL DAILY
Qty: 30 TABLET | Refills: 0 | Status: SHIPPED | OUTPATIENT
Start: 2021-09-07 | End: 2021-09-28 | Stop reason: SDUPTHER

## 2021-09-07 NOTE — TELEPHONE ENCOUNTER
Caller: Stephan Bass    Relationship: Self    Best call back number: [phone]     Medication needed:   Requested Prescriptions     Pending Prescriptions Disp Refills   • desvenlafaxine (PRISTIQ) 50 MG 24 hr tablet [Pharmacy Med Name: DESVENLAFAXINE ER SUCCINATE 50MG T] 30 tablet 11     Sig: TAKE 1 TABLET BY MOUTH DAILY       When do you need the refill by: ASAP  What additional details did the patient provide when requesting the medication: DUE TO BE FILLED, TWO PILLS LEFT BUT LEAVING TO GO OUT OF TOWN FOR 1 WEEK TODAY, 09/07/2021    Does the patient have less than a 3 day supply:  [x] Yes  [] No    What is the patient's preferred pharmacy: [facility] #[id number] [address] AT [facility] ( [address] & [facility] [phone] [facility] [phone] FX

## 2021-09-28 ENCOUNTER — OFFICE VISIT (OUTPATIENT)
Dept: FAMILY MEDICINE CLINIC | Facility: CLINIC | Age: 56
End: 2021-09-28

## 2021-09-28 VITALS
WEIGHT: 227.4 LBS | SYSTOLIC BLOOD PRESSURE: 124 MMHG | HEIGHT: 73 IN | RESPIRATION RATE: 14 BRPM | HEART RATE: 67 BPM | DIASTOLIC BLOOD PRESSURE: 72 MMHG | TEMPERATURE: 96.9 F | OXYGEN SATURATION: 99 % | BODY MASS INDEX: 30.14 KG/M2

## 2021-09-28 DIAGNOSIS — Z12.5 SPECIAL SCREENING FOR MALIGNANT NEOPLASM OF PROSTATE: ICD-10-CM

## 2021-09-28 DIAGNOSIS — F41.1 GENERALIZED ANXIETY DISORDER: ICD-10-CM

## 2021-09-28 DIAGNOSIS — Z00.00 ROUTINE GENERAL MEDICAL EXAMINATION AT A HEALTH CARE FACILITY: Primary | ICD-10-CM

## 2021-09-28 PROCEDURE — 99396 PREV VISIT EST AGE 40-64: CPT | Performed by: PHYSICIAN ASSISTANT

## 2021-09-28 RX ORDER — DESVENLAFAXINE SUCCINATE 50 MG/1
50 TABLET, EXTENDED RELEASE ORAL DAILY
Qty: 90 TABLET | Refills: 3 | Status: SHIPPED | OUTPATIENT
Start: 2021-09-28 | End: 2022-06-20 | Stop reason: SDUPTHER

## 2021-09-28 NOTE — PROGRESS NOTES
Subjective   Stephan Bass is a 56 y.o. male  Depression (RF Pristiq #90) and Annual Exam      History of Present Illness  Patient is a pleasant 56-year-old white male who comes in for preventive medical examination doing well no problems or complaints also needs refill on depression anxiety meds.  The following portions of the patient's history were reviewed and updated as appropriate: allergies, current medications, past social history and problem list    Review of Systems   Constitutional: Negative for appetite change, diaphoresis, fatigue and unexpected weight change.   Eyes: Negative for visual disturbance.   Respiratory: Negative for cough, chest tightness and shortness of breath.    Cardiovascular: Negative for chest pain, palpitations and leg swelling.   Gastrointestinal: Negative for diarrhea, nausea and vomiting.   Endocrine: Negative for polydipsia, polyphagia and polyuria.   Musculoskeletal: Positive for back pain.   Skin: Negative for color change and rash.   Neurological: Negative for dizziness, syncope, weakness, light-headedness, numbness and headaches.       Objective     Vitals:    09/28/21 1105   BP: 124/72   Pulse: 67   Resp: 14   Temp: 96.9 °F (36.1 °C)   SpO2: 99%       Physical Exam  Vitals and nursing note reviewed.   Constitutional:       Appearance: He is well-developed.   Neck:      Vascular: No JVD.   Cardiovascular:      Rate and Rhythm: Normal rate and regular rhythm.      Pulses: Normal pulses.      Heart sounds: Normal heart sounds. No murmur heard.     Pulmonary:      Effort: Pulmonary effort is normal. No respiratory distress.      Breath sounds: Normal breath sounds.   Abdominal:      General: Bowel sounds are normal.      Palpations: Abdomen is soft.      Tenderness: There is no abdominal tenderness.   Skin:     General: Skin is warm and dry.         Assessment/Plan     Diagnoses and all orders for this visit:    1. Routine general medical examination at a health care facility  (Primary)  -     Comprehensive Metabolic Panel; Future  -     CBC & Differential; Future  -     Lipid Panel; Future  -     TSH; Future    2. Special screening for malignant neoplasm of prostate  -     PSA Screen; Future    3. Generalized anxiety disorder  -     desvenlafaxine (PRISTIQ) 50 MG 24 hr tablet; Take 1 tablet by mouth Daily.  Dispense: 90 tablet; Refill: 3     Preventive medicine discussed, diet, exercise, healthy living discussed at length.  Discussed nutrition, physical activity, healthy weight, injury prevention, missed use of tobacco, alcohol and drugs, dental health, mental health, immunizations, screening    Please note that portions of this document were completed with a voice recognition program. Efforts were made to edit the dictations, but occasionally words are mis-transcribed

## 2021-10-04 RX ORDER — TADALAFIL 5 MG
TABLET ORAL
Qty: 90 TABLET | Refills: 3 | Status: SHIPPED | OUTPATIENT
Start: 2021-10-04 | End: 2022-08-25 | Stop reason: SDUPTHER

## 2021-12-23 DIAGNOSIS — B00.1 RECURRENT COLD SORES: ICD-10-CM

## 2021-12-23 RX ORDER — ACYCLOVIR 200 MG/1
CAPSULE ORAL
Qty: 180 CAPSULE | Refills: 1 | Status: SHIPPED | OUTPATIENT
Start: 2021-12-23 | End: 2022-06-20 | Stop reason: SDUPTHER

## 2022-05-09 PROCEDURE — U0004 COV-19 TEST NON-CDC HGH THRU: HCPCS | Performed by: NURSE PRACTITIONER

## 2022-05-17 ENCOUNTER — NURSE TRIAGE (OUTPATIENT)
Dept: CALL CENTER | Facility: HOSPITAL | Age: 57
End: 2022-05-17

## 2022-05-17 NOTE — TELEPHONE ENCOUNTER
"HUB- He was diagnosed with covid  On May 9th, 2022. He started coughing up blood today, 05/17/2022-  He is hoarse. He refused Er and urgent care.   He would like an office visit time as soon as possible. Attempted to call back line for an office time.  He has an office time for May 18th at 315 pm  with provider. Explained if coughing up blood worsened need to seek medical attention.     Reason for Disposition  • [1] More than one episode of coughing up blood AND [2] < 1 tablespoon (15 ml) of pure red blood    Additional Information  • Negative: SEVERE difficulty breathing (e.g., struggling for each breath, speaks in single words)  • Negative: [1] Chest pain AND [2] difficulty breathing  • Negative: Bluish (or gray) lips or face now  • Negative: Passed out (i.e., lost consciousness, collapsed and was not responding)  • Negative: Shock suspected (e.g., cold/pale/clammy skin, too weak to stand, low BP, rapid pulse)  • Negative: Difficult to awaken or acting confused (e.g., disoriented, slurred speech)  • Negative: Recent chest injury (i.e., past 24 hours)  • Negative: [1] Coughed up blood AND [2] large amount (example: \"a cup of blood\")  • Negative: Sounds like a life-threatening emergency to the triager  • Negative: [1] MODERATE difficulty breathing (e.g., speaks in phrases, SOB even at rest, pulse 100-120) AND [2] still present when not coughing  • Negative: Chest pain  • Negative: Unclear to triager if the patient is coughing up blood or vomiting blood  • Negative: History of prior \"blood clot\" in leg or lungs (i.e., deep vein thrombosis, pulmonary embolism)  • Negative: History of inherited increased risk of blood clots (e.g., Factor 5 Leiden, Anti-thrombin 3, Protein C or Protein S deficiency, Prothrombin mutation)  • Negative: Pregnant or pregnant in past month  • Negative: Hip or leg fracture (broken bone) in past month (or had cast on leg or ankle in past month)  • Negative: Prolonged travel within the last " "month  (e.g., long bus trip or plane trip)  • Negative: Bedridden (e.g., nursing home patient, CVA, chronic illness, recovering from surgery)  • Negative: Patient sounds very sick or weak to the triager  • Negative: [1] MILD difficulty breathing (e.g., minimal/no SOB at rest, SOB with walking, pulse <100) AND [2] still present when not coughing (Exception: no change from usual, chronic shortness of breath)  • Negative: [1] Coughed up blood AND [2] > 1 tablespoon (15 ml) (Exception: blood-tinged sputum)  • Negative: Fever > 103 F (39.4 C)  • Negative: [1] Fever > 101 F (38.3 C) AND [2] age > 60 years  • Negative: [1] Fever > 100.0 F (37.8 C) AND [2] diabetes mellitus or weak immune system (e.g., HIV positive, cancer chemo, splenectomy, organ transplant, chronic steroids)  • Negative: [1] Has underlying lung disease (e.g., COPD, chronic bronchitis or emphysema) AND [2] sputum has turned yellow or green in color  • Negative: Coughing up marli-colored sputum  • Negative: [1] Nasal discharge AND [2] present > 10 days  • Negative: [1] Coughing up yellow or green sputum AND [2] present > 5 days  • Negative: Fever present > 3 days (72 hours)  • Negative: Taking Coumadin (warfarin) or other strong blood thinner, or known bleeding disorder (e.g., thrombocytopenia)  • Negative: Cough has been present for > 3 weeks    Answer Assessment - Initial Assessment Questions  1. ONSET: \"When did the cough begin?\"       May 9th - It has gotten worse.   2. SEVERITY: \"How bad is the cough today?\" \"Did the blood appear after a coughing spell?\"       Blood appears every coughing spell.   3. SPUTUM: \"Describe the color of your sputum\" (none, dry cough; clear, white, yellow, green)      Clear with blood in it.   4. HEMOPTYSIS: \"How much blood?\" (flecks, streaks, tablespoons, etc.)      Streaks to half of a tsp.   5. DIFFICULTY BREATHING: \"Are you having difficulty breathing?\" If Yes, ask: \"How bad is it?\" (e.g., mild, moderate, severe)     - " "MILD: No SOB at rest, mild SOB with walking, speaks normally in sentences, can lay down, no retractions, pulse < 100.     - MODERATE: SOB at rest, SOB with minimal exertion and prefers to sit, cannot lie down flat, speaks in phrases, mild retractions, audible wheezing, pulse 100-120.     - SEVERE: Very SOB at rest, speaks in single words, struggling to breathe, sitting hunched forward, retractions, pulse > 120       Mild   6. FEVER: \"Do you have a fever?\" If Yes, ask: \"What is your temperature, how was it measured, and when did it start?\"      none  7. CARDIAC HISTORY: \"Do you have any history of heart disease?\" (e.g., heart attack, congestive heart failure)       none  8. LUNG HISTORY: \"Do you have any history of lung disease?\"  (e.g., pulmonary embolus, asthma, emphysema)      no  9. PE RISK FACTORS: \"Do you have a history of blood clots?\" (or: recent major surgery, recent prolonged travel, bedridden)      no  10. OTHER SYMPTOMS: \"Do you have any other symptoms?\" (e.g., runny nose, wheezing, chest pain)        no  11. PREGNANCY: \"Is there any chance you are pregnant?\" \"When was your last menstrual period?\"        no  12. TRAVEL: \"Have you traveled out of the country in the last month?\" (e.g., travel history, exposures)        no    Protocols used: COUGHING UP BLOOD-ADULT-AH      "

## 2022-05-18 ENCOUNTER — OFFICE VISIT (OUTPATIENT)
Dept: FAMILY MEDICINE CLINIC | Facility: CLINIC | Age: 57
End: 2022-05-18

## 2022-05-18 VITALS
TEMPERATURE: 97.1 F | SYSTOLIC BLOOD PRESSURE: 124 MMHG | BODY MASS INDEX: 29.42 KG/M2 | OXYGEN SATURATION: 98 % | HEIGHT: 73 IN | WEIGHT: 222 LBS | HEART RATE: 71 BPM | DIASTOLIC BLOOD PRESSURE: 72 MMHG | RESPIRATION RATE: 14 BRPM

## 2022-05-18 DIAGNOSIS — R05.9 COUGH: Primary | ICD-10-CM

## 2022-05-18 DIAGNOSIS — J40 BRONCHITIS: Primary | ICD-10-CM

## 2022-05-18 PROCEDURE — 99213 OFFICE O/P EST LOW 20 MIN: CPT | Performed by: PHYSICIAN ASSISTANT

## 2022-05-18 RX ORDER — CEFDINIR 300 MG/1
300 CAPSULE ORAL 2 TIMES DAILY
Qty: 20 CAPSULE | Refills: 0 | Status: SHIPPED | OUTPATIENT
Start: 2022-05-18 | End: 2022-06-30

## 2022-05-18 NOTE — PROGRESS NOTES
Subjective   Stephna Bass is a 56 y.o. male  Cough (For several days. Dx'd with COVID May 9 but cough and fatigue persistent, occ productive (phlegm/blood). Taking Tessalon Perles, cefdinir, prom DM, Medrol Dosepak.)      History of Present Illness    Stephan Bass is a pleasant male who presents today with a severe cough. He was diagnosed with COVID-19 on 05/09/2022, but his cough has been persistent. The patient has been taking Tessalon Perles, Omnicef, Medrol Dosepak, and Promethazine DM.    The patient reports that he is still coughing. He states that when he comes in to see him, he has an infection. The patient reports that the cough goes down to his lungs. He states that he has a lot of gurgling and he is spitting up a lot of phlegm. The patient reports that he was coughing uncontrollably and that his muscles were hurting everywhere. He states that he did not have enough time to head out of town. The patient reports that he went to a walk-in clinic, where they tested him for COVID-19, which was positive, and they prescribed him medication. He states that he still has the symptoms. The patient reports that he thinks he still needs 10 days of the antibiotic. He states that he did not give him an inhaler to use, but he has one. The patient reports that he has not coughed up much phlegm since the antibiotics.    I spent 15 minutes in patient care: Reviewing records prior to the visit, examining the patient, entering orders and documentation    Part of this note may be an electronic transcription/translation of spoken language to printed text using the Dragon Dictation System.        The following portions of the patient's history were reviewed and updated as appropriate: allergies, current medications, past social history and problem list    Review of Systems   Constitutional: Negative for chills, fatigue, fever and unexpected weight change.   HENT: Negative.    Respiratory: Positive for cough, chest tightness  and wheezing. Negative for shortness of breath.    Cardiovascular: Negative for chest pain, palpitations and leg swelling.   Gastrointestinal: Negative for nausea.   Skin: Negative for color change and rash.   Neurological: Negative for dizziness, syncope, weakness and headaches.       Objective     Vitals:    05/18/22 1526   BP: 124/72   Pulse: 71   Resp: 14   Temp: 97.1 °F (36.2 °C)   SpO2: 98%       Physical Exam  Vitals and nursing note reviewed.   Constitutional:       General: He is not in acute distress.     Appearance: Normal appearance. He is well-developed. He is not ill-appearing, toxic-appearing or diaphoretic.   HENT:      Head: Normocephalic and atraumatic.      Nose: Congestion present.   Neck:      Vascular: No carotid bruit or JVD.   Cardiovascular:      Rate and Rhythm: Normal rate and regular rhythm.      Pulses: Normal pulses.      Heart sounds: Normal heart sounds. No murmur heard.  Pulmonary:      Effort: Pulmonary effort is normal. No respiratory distress.      Breath sounds: No stridor. Wheezing present.   Abdominal:      Palpations: Abdomen is soft.      Tenderness: There is no abdominal tenderness.   Musculoskeletal:      Cervical back: Neck supple.   Lymphadenopathy:      Cervical: No cervical adenopathy.   Skin:     General: Skin is warm and dry.   Neurological:      Mental Status: He is alert.         Assessment & Plan     Diagnoses and all orders for this visit:    1. Bronchitis (Primary)  -     cefdinir (OMNICEF) 300 MG capsule; Take 1 capsule by mouth 2 (Two) Times a Day.  Dispense: 20 capsule; Refill: 0     tussionex  Sig 1 tsp q 12hours #120ml     1. Bronchitis  - I will prescribe the patient 7 more days of Omnicef and Tussionex.  - He will continue to use the albuterol inhaler at home.      Transcribed from ambient dictation for SHIVA Santamaria by Missy Rome.  05/18/22   16:05 EDT    Patient verbalized consent to the visit recording.

## 2022-06-20 DIAGNOSIS — B00.1 RECURRENT COLD SORES: ICD-10-CM

## 2022-06-20 DIAGNOSIS — F41.1 GENERALIZED ANXIETY DISORDER: ICD-10-CM

## 2022-06-20 RX ORDER — ACYCLOVIR 200 MG/1
200 CAPSULE ORAL EVERY 12 HOURS
Qty: 180 CAPSULE | Refills: 1 | Status: SHIPPED | OUTPATIENT
Start: 2022-06-20 | End: 2022-06-21

## 2022-06-20 RX ORDER — DESVENLAFAXINE SUCCINATE 50 MG/1
50 TABLET, EXTENDED RELEASE ORAL DAILY
Qty: 90 TABLET | Refills: 3 | Status: SHIPPED | OUTPATIENT
Start: 2022-06-20

## 2022-06-20 NOTE — TELEPHONE ENCOUNTER
Rx Refill Note  Requested Prescriptions     Pending Prescriptions Disp Refills   • acyclovir (ZOVIRAX) 200 MG capsule 180 capsule 1     Sig: Take 1 capsule by mouth Every 12 (Twelve) Hours.   • desvenlafaxine (PRISTIQ) 50 MG 24 hr tablet 90 tablet 3     Sig: Take 1 tablet by mouth Daily.      Last office visit with prescribing clinician: 5/18/2022      Next office visit with prescribing clinician: 9/27/2022            Yvette Tan MA  06/20/22, 09:33 EDT

## 2022-06-20 NOTE — TELEPHONE ENCOUNTER
Caller: Stephan Bass    Relationship: Self    Best call back number: [phone]    Requested Prescriptions:   Requested Prescriptions     Pending Prescriptions Disp Refills   • acyclovir (ZOVIRAX) 200 MG capsule 180 capsule 1     Sig: Take 1 capsule by mouth Every 12 (Twelve) Hours.   • desvenlafaxine (PRISTIQ) 50 MG 24 hr tablet 90 tablet 3     Sig: Take 1 tablet by mouth Daily.        Pharmacy where request should be sent: [facility] - [place], [place] - [facility] [address] AT [address] TO [facility] - [phone]  - [phone]      Additional details provided by patient:      Does the patient have less than a 3 day supply:  [x] Yes  [] No    Yamini Baez   06/20/22 09:30 EDT

## 2022-06-21 DIAGNOSIS — B00.1 RECURRENT COLD SORES: ICD-10-CM

## 2022-06-21 RX ORDER — ACYCLOVIR 200 MG/1
CAPSULE ORAL
Qty: 180 CAPSULE | Refills: 1 | Status: SHIPPED | OUTPATIENT
Start: 2022-06-21 | End: 2022-12-05

## 2022-06-30 ENCOUNTER — OFFICE VISIT (OUTPATIENT)
Dept: FAMILY MEDICINE CLINIC | Facility: CLINIC | Age: 57
End: 2022-06-30

## 2022-06-30 VITALS
WEIGHT: 224.6 LBS | HEART RATE: 68 BPM | TEMPERATURE: 97.1 F | HEIGHT: 73 IN | OXYGEN SATURATION: 97 % | RESPIRATION RATE: 14 BRPM | BODY MASS INDEX: 29.77 KG/M2 | SYSTOLIC BLOOD PRESSURE: 138 MMHG | DIASTOLIC BLOOD PRESSURE: 80 MMHG

## 2022-06-30 DIAGNOSIS — K64.8 INTERNAL HEMORRHOIDS: Primary | ICD-10-CM

## 2022-06-30 PROCEDURE — 99213 OFFICE O/P EST LOW 20 MIN: CPT | Performed by: PHYSICIAN ASSISTANT

## 2022-06-30 RX ORDER — HYDROCORTISONE ACETATE 25 MG/1
25 SUPPOSITORY RECTAL 2 TIMES DAILY
Qty: 14 SUPPOSITORY | Refills: 1 | Status: SHIPPED | OUTPATIENT
Start: 2022-06-30

## 2022-06-30 RX ORDER — HYDROCORTISONE 25 MG/G
CREAM TOPICAL 2 TIMES DAILY
Qty: 28 G | Refills: 3 | Status: SHIPPED | OUTPATIENT
Start: 2022-06-30

## 2022-06-30 NOTE — PROGRESS NOTES
Subjective   Stephan Bass is a 57 y.o. male  Hemorrhoids (Taking OTC meds and seen at walk-in clinic who prescribed hydrocortisone cream which helps some )      History of Present Illness  Patient is very pleasant 57-year-old male who comes in complaining internal hemorrhoids has been taking without medication with walking clinic was prescribed cortisone cream psych symptoms are no better.  Complains of inflamed tender hemorrhoids itchy irritated  The following portions of the patient's history were reviewed and updated as appropriate: allergies, current medications, past social history and problem list    Review of Systems   Constitutional: Negative for fatigue and unexpected weight change.   HENT: Negative for sinus pressure.    Respiratory: Negative for cough, chest tightness and shortness of breath.    Cardiovascular: Negative for chest pain, palpitations and leg swelling.   Gastrointestinal: Negative for nausea.   Skin: Negative for color change and rash.   Neurological: Negative for dizziness, syncope, weakness and headaches.       Objective     Vitals:    06/30/22 1623   BP: 138/80   Pulse: 68   Resp: 14   Temp: 97.1 °F (36.2 °C)   SpO2: 97%       Physical Exam  Vitals and nursing note reviewed.   Constitutional:       General: He is not in acute distress.     Appearance: Normal appearance. He is well-developed. He is not ill-appearing, toxic-appearing or diaphoretic.   Neck:      Vascular: No carotid bruit or JVD.   Cardiovascular:      Rate and Rhythm: Normal rate and regular rhythm.      Pulses: Normal pulses.      Heart sounds: Normal heart sounds. No murmur heard.  Pulmonary:      Effort: Pulmonary effort is normal. No respiratory distress.      Breath sounds: Normal breath sounds.   Abdominal:      Palpations: Abdomen is soft.      Tenderness: There is no abdominal tenderness.   Genitourinary:     Rectum: External hemorrhoid and internal hemorrhoid present.   Skin:     General: Skin is warm and dry.    Neurological:      Mental Status: He is alert.         Assessment & Plan     Diagnoses and all orders for this visit:    1. Internal hemorrhoids (Primary)  -     Hydrocortisone, Perianal, (ANUSOL-HC) 2.5 % rectal cream; Insert  into the rectum 2 (Two) Times a Day.  Dispense: 28 g; Refill: 3  -     hydrocortisone (ANUSOL-HC) 25 MG suppository; Insert 1 suppository into the rectum 2 (Two) Times a Day.  Dispense: 14 suppository; Refill: 1    Warm baths, increase fiber intake avoid straining and use bathroom    I spent 15 minutes in patient care: Reviewing records prior to the visit, examining the patient, entering orders and documentation    Part of this note may be an electronic transcription/translation of spoken language to printed text using the Dragon Dictation System.

## 2022-08-25 RX ORDER — TADALAFIL 5 MG
5 TABLET ORAL DAILY
Qty: 90 TABLET | Refills: 3 | Status: SHIPPED | OUTPATIENT
Start: 2022-08-25 | End: 2022-09-13 | Stop reason: SDUPTHER

## 2022-09-12 ENCOUNTER — TELEPHONE (OUTPATIENT)
Dept: FAMILY MEDICINE CLINIC | Facility: CLINIC | Age: 57
End: 2022-09-12

## 2022-09-12 RX ORDER — BENZONATATE 100 MG/1
100 CAPSULE ORAL 2 TIMES DAILY
Qty: 20 CAPSULE | Refills: 0 | Status: SHIPPED | OUTPATIENT
Start: 2022-09-12 | End: 2022-09-27

## 2022-09-12 RX ORDER — AZITHROMYCIN 250 MG/1
TABLET, FILM COATED ORAL
Qty: 6 TABLET | Refills: 0 | Status: SHIPPED | OUTPATIENT
Start: 2022-09-12 | End: 2022-09-27

## 2022-09-12 NOTE — TELEPHONE ENCOUNTER
PATIENT HAS UPPER RESPIRATORY INFECTION WITH COUGH, CONGESTION, NASAL DRAINAGE. SAID HE GETS THIS EVERY YEAR AND USUALLY PCP WILL CALL IN A ZPACK AND PEARLS FOR COUGHING, AND ANTIBIOTIC.   WANTED TO KNOW IF THAT CAN BE SENT IN FOR HIM OR BE WORKED IN TODAY.    [facility]/pharmacy #[id number] - [place], [place] - [address] AT [facility] - [phone]  - [phone]    [address] [address]   Phone: [phone] Fax: [phone]

## 2022-09-13 ENCOUNTER — TELEPHONE (OUTPATIENT)
Dept: FAMILY MEDICINE CLINIC | Facility: CLINIC | Age: 57
End: 2022-09-13

## 2022-09-13 DIAGNOSIS — N52.9 ERECTILE DYSFUNCTION, UNSPECIFIED ERECTILE DYSFUNCTION TYPE: Primary | ICD-10-CM

## 2022-09-13 RX ORDER — METHYLPREDNISOLONE 4 MG/1
TABLET ORAL
Qty: 21 TABLET | Refills: 1 | Status: SHIPPED | OUTPATIENT
Start: 2022-09-13 | End: 2022-09-27

## 2022-09-13 RX ORDER — CEFDINIR 300 MG/1
300 CAPSULE ORAL 2 TIMES DAILY
Qty: 20 CAPSULE | Refills: 0 | Status: SHIPPED | OUTPATIENT
Start: 2022-09-13 | End: 2022-09-27

## 2022-09-13 RX ORDER — BENZONATATE 100 MG/1
100 CAPSULE ORAL 2 TIMES DAILY
Qty: 20 CAPSULE | Refills: 0 | Status: SHIPPED | OUTPATIENT
Start: 2022-09-13 | End: 2022-09-27

## 2022-09-13 RX ORDER — DEXTROMETHORPHAN HYDROBROMIDE AND PROMETHAZINE HYDROCHLORIDE 15; 6.25 MG/5ML; MG/5ML
5 SYRUP ORAL 4 TIMES DAILY PRN
Qty: 120 ML | Refills: 0 | Status: SHIPPED | OUTPATIENT
Start: 2022-09-13 | End: 2022-09-27

## 2022-09-13 RX ORDER — PROMETHAZINE HYDROCHLORIDE 25 MG/1
25 TABLET ORAL EVERY 6 HOURS PRN
Qty: 120 TABLET | Refills: 0 | Status: SHIPPED | OUTPATIENT
Start: 2022-09-13

## 2022-09-13 RX ORDER — TADALAFIL 5 MG
5 TABLET ORAL DAILY
Qty: 90 TABLET | Refills: 3 | Status: SHIPPED | OUTPATIENT
Start: 2022-09-13

## 2022-09-13 NOTE — TELEPHONE ENCOUNTER
Pt came in person and delivered a paper about what prescriptions he was needing sent in to the [facility] in [place].   Offered appt with Tomasz Thursday 15th but he was unable to make that appointment and is leaving Friday.  Pt requesting:  Omnicef-  (advised sent via mailservice but gives him diarrhea, and won't get them in time)   Strong cough syrup to take at night  Tessalon Perles (advised sent but will not get them in time through mail service, will pay out of pocket)  Steroid -as he gets coughing fits where he cannot breath or talk    Please send to the [facility] in [place] not mailservice.     Please send refill of Cyalis via mail service.

## 2022-09-13 NOTE — TELEPHONE ENCOUNTER
Meds sent to [facility] by Tomasz.    Chart shows that he is allergic to codeine so promethazine DM was sent in. [facility] no longer fills promethazine with codeine per a fax that our office received.    Cialis is being sent to mail order.

## 2022-09-27 ENCOUNTER — LAB (OUTPATIENT)
Dept: LAB | Facility: HOSPITAL | Age: 57
End: 2022-09-27

## 2022-09-27 ENCOUNTER — OFFICE VISIT (OUTPATIENT)
Dept: FAMILY MEDICINE CLINIC | Facility: CLINIC | Age: 57
End: 2022-09-27

## 2022-09-27 VITALS
DIASTOLIC BLOOD PRESSURE: 80 MMHG | SYSTOLIC BLOOD PRESSURE: 130 MMHG | OXYGEN SATURATION: 98 % | TEMPERATURE: 96.6 F | WEIGHT: 224 LBS | HEIGHT: 73 IN | HEART RATE: 63 BPM | BODY MASS INDEX: 29.69 KG/M2 | RESPIRATION RATE: 16 BRPM

## 2022-09-27 DIAGNOSIS — F41.1 GENERALIZED ANXIETY DISORDER: ICD-10-CM

## 2022-09-27 DIAGNOSIS — Z12.5 PROSTATE CANCER SCREENING: ICD-10-CM

## 2022-09-27 DIAGNOSIS — J45.20 MILD INTERMITTENT ASTHMA WITHOUT COMPLICATION: ICD-10-CM

## 2022-09-27 DIAGNOSIS — B00.1 RECURRENT COLD SORES: ICD-10-CM

## 2022-09-27 DIAGNOSIS — J30.9 ALLERGIC RHINITIS, UNSPECIFIED SEASONALITY, UNSPECIFIED TRIGGER: ICD-10-CM

## 2022-09-27 DIAGNOSIS — Z00.00 ANNUAL PHYSICAL EXAM: ICD-10-CM

## 2022-09-27 DIAGNOSIS — Z00.00 ANNUAL PHYSICAL EXAM: Primary | ICD-10-CM

## 2022-09-27 LAB — T4 FREE SERPL-MCNC: 1.17 NG/DL (ref 0.93–1.7)

## 2022-09-27 PROCEDURE — 84402 ASSAY OF FREE TESTOSTERONE: CPT

## 2022-09-27 PROCEDURE — 84403 ASSAY OF TOTAL TESTOSTERONE: CPT

## 2022-09-27 PROCEDURE — 80061 LIPID PANEL: CPT | Performed by: PHYSICIAN ASSISTANT

## 2022-09-27 PROCEDURE — G0103 PSA SCREENING: HCPCS | Performed by: PHYSICIAN ASSISTANT

## 2022-09-27 PROCEDURE — 80050 GENERAL HEALTH PANEL: CPT | Performed by: PHYSICIAN ASSISTANT

## 2022-09-27 PROCEDURE — 84439 ASSAY OF FREE THYROXINE: CPT

## 2022-09-27 PROCEDURE — 99396 PREV VISIT EST AGE 40-64: CPT | Performed by: FAMILY MEDICINE

## 2022-09-27 NOTE — PROGRESS NOTES
Subjective   Stephan Bass is a 57 y.o. male    Chief Complaint    Annual physical exam  Prostate cancer screening  Allergic rhinitis  Anxiety    History of Present Illness  The patient presents today for his annual exam and health maintenance. We will update his health maintenance issues. He reports he is doing well. He is a little overweight at approximately 220 pounds. He has been working a lot on sit-ups, crunches, and push-ups 4 days a week. The patient states it has been a big help to his back because he has 3 degenerative discs. He had a myelogram and had almost 100 percent blockage. The patient has spinal stenosis and saw a spinal surgeon, Dr. Cardenas, in 2018. The patient has been playing a lot of golf and strengthening his core has really helped out tremendously. He needs to get some muscle mass back up.    He is taking acyclovir on a regular basis, Lexapro daily, Pristiq daily, Allegra daily, 2 puffs in the morning and 2 puffs at night of Symbicort, 2 puffs when he wakes up, 2 puffs before he goes to bed, and then to wash his mouth thoroughly after that. The patient has a couple of other things that he can use when he wants to. He uses Rhinocort as needed. The patient is spending a lot more time outdoors.  [place] now [place] [facility]. the patient has a pickup truck and a tractor.      The patient states he gets a cold 2 to 3 times a year and it starts off as a cold and then goes down in his throat and then he gets a convulsive cough productive of dark phlegm that makes him unable to talk. He was prescribed prednisone for him, but he cannot take Levaquin. The patient states he just got over that a couple of weeks ago.    He would like to have his prostate checked.    The patient states he did have an issue with hemorrhoids remotely. He was given suppositories and they took care of it.    The following portions of the patient's history were reviewed and updated as appropriate: allergies,  current medications, past social history and problem list    Review of Systems   Constitutional: Negative.  Negative for appetite change, chills, fatigue and fever.   HENT: Negative.  Negative for congestion, ear pain, hearing loss, sinus pressure and trouble swallowing.    Eyes: Negative.    Respiratory: Negative.  Negative for cough, chest tightness, shortness of breath and wheezing.    Cardiovascular: Negative.  Negative for chest pain.   Gastrointestinal: Negative.  Negative for abdominal pain, diarrhea and nausea.   Endocrine: Negative.    Genitourinary: Negative.    Musculoskeletal: Negative.    Skin: Negative.    Allergic/Immunologic: Negative.    Neurological: Negative.  Negative for dizziness, tremors, weakness, light-headedness and headaches.   Hematological: Negative.    Psychiatric/Behavioral: Negative.  Negative for agitation, behavioral problems, confusion, decreased concentration and suicidal ideas.   All other systems reviewed and are negative.      Objective     Vitals:    09/27/22 0903   BP: 130/80   Pulse: 63   Resp: 16   Temp: 96.6 °F (35.9 °C)   SpO2: 98%       Physical Exam  Vitals and nursing note reviewed.   Constitutional:       General: He is not in acute distress.     Appearance: Normal appearance. He is well-developed. He is not ill-appearing, toxic-appearing or diaphoretic.   HENT:      Head: Normocephalic and atraumatic.      Right Ear: External ear normal.      Left Ear: External ear normal.      Nose: Nose normal.   Eyes:      Conjunctiva/sclera: Conjunctivae normal.      Pupils: Pupils are equal, round, and reactive to light.   Neck:      Thyroid: No thyroid mass or thyromegaly.      Vascular: No carotid bruit.   Cardiovascular:      Rate and Rhythm: Normal rate and regular rhythm.      Pulses: Normal pulses.      Heart sounds: Normal heart sounds. No murmur heard.  Pulmonary:      Effort: Pulmonary effort is normal. No respiratory distress.      Breath sounds: Normal breath sounds.  No wheezing or rales.   Chest:      Chest wall: No tenderness.   Abdominal:      General: Bowel sounds are normal.      Palpations: Abdomen is soft. There is no mass.      Tenderness: There is no abdominal tenderness.   Musculoskeletal:         General: No swelling. Normal range of motion.      Cervical back: Normal range of motion and neck supple.   Lymphadenopathy:      Cervical: No cervical adenopathy.   Skin:     General: Skin is warm and dry.      Findings: No lesion or rash.   Neurological:      Mental Status: He is alert and oriented to person, place, and time.      Cranial Nerves: No cranial nerve deficit.      Sensory: No sensory deficit.      Motor: No weakness.      Coordination: Coordination normal.      Gait: Gait normal.      Deep Tendon Reflexes: Reflexes are normal and symmetric.   Psychiatric:         Attention and Perception: Attention and perception normal. He is attentive.         Mood and Affect: Mood normal. Mood is not depressed. Affect is not angry or inappropriate.         Speech: Speech normal.         Behavior: Behavior normal.         Thought Content: Thought content normal.         Cognition and Memory: Cognition normal.         Judgment: Judgment normal.         Assessment & Plan   Problems Addressed this Visit        Allergies and Adverse Reactions    Allergic rhinitis    Relevant Orders    CBC (No Diff)    Comprehensive Metabolic Panel       Mental Health    Generalized anxiety disorder    Relevant Orders    TSH    T4, Free      Other Visit Diagnoses     Annual physical exam    -  Primary    Relevant Orders    CBC (No Diff)    Comprehensive Metabolic Panel    Lipid Panel    TSH    T4, Free    Testosterone, Free, Total    Prostate cancer screening        Relevant Orders    PSA Screen    Recurrent cold sores        Relevant Orders    CBC (No Diff)    Comprehensive Metabolic Panel    Mild intermittent asthma without complication        Relevant Orders    CBC (No Diff)    Comprehensive  Metabolic Panel      Diagnoses       Codes Comments    Annual physical exam    -  Primary ICD-10-CM: Z00.00  ICD-9-CM: V70.0     Prostate cancer screening     ICD-10-CM: Z12.5  ICD-9-CM: V76.44     Recurrent cold sores     ICD-10-CM: B00.1  ICD-9-CM: 054.9     Generalized anxiety disorder     ICD-10-CM: F41.1  ICD-9-CM: 300.02     Allergic rhinitis, unspecified seasonality, unspecified trigger     ICD-10-CM: J30.9  ICD-9-CM: 477.9     Mild intermittent asthma without complication     ICD-10-CM: J45.20  ICD-9-CM: 493.90         Preventive medicine discussed, diet, exercise, healthy living discussed at length.  Discussed nutrition, physical activity, healthy weight, injury prevention, misuse of tobacco, alcohol and drugs, dental health, mental health, immunizations, screening    Part of this note may be an electronic transcription/translation of spoken language to printed text using the Dragon Dictation System.         Transcribed from ambient dictation for YOEL Potts MD by Enid Carpenter.  09/27/22   11:02 EDT    Patient verbalized consent to the visit recording.  I have personally performed the services described in this document as transcribed by the above individual, and it is both accurate and complete.

## 2022-09-30 LAB
TESTOST FREE SERPL-MCNC: 3.8 PG/ML (ref 7.2–24)
TESTOST SERPL-MCNC: 402 NG/DL (ref 264–916)

## 2022-11-01 ENCOUNTER — TELEPHONE (OUTPATIENT)
Dept: FAMILY MEDICINE CLINIC | Facility: CLINIC | Age: 57
End: 2022-11-01

## 2022-11-01 NOTE — TELEPHONE ENCOUNTER
Caller: Stephan Bass    Relationship to patient: Self    Best call back number: [phone]    What is the call regarding:  PATIENT WOULD LIKE TO HAVE SHIRA CALL HIM BACK TO DISCUSS HIS LAB RESULTS.

## 2022-11-01 NOTE — TELEPHONE ENCOUNTER
Likely not a true testosterone deficiency since total testosterone is normal. Recommend repeat test in 2-3 months

## 2022-11-01 NOTE — TELEPHONE ENCOUNTER
Testosterone, free was slightly low, I'm assuming that's what he's calling about. Can you advise on that?

## 2022-11-15 ENCOUNTER — TELEPHONE (OUTPATIENT)
Dept: FAMILY MEDICINE CLINIC | Facility: CLINIC | Age: 57
End: 2022-11-15

## 2022-11-15 NOTE — TELEPHONE ENCOUNTER
Patient has cough (all day & night), sore throat and congestion. Wanted to know if he can get something called in today since there are no appointments. He has requested the following:    cefdinir (OMNICEF) 300 MG capsule     Order Details  Dose: 300 mg Route: Oral     Steroid  (not a zpak)    promethazine-dextromethorphan (PROMETHAZINE-DM) 6.25-15 MG/5ML syrup       [facility]/pharmacy #[id number] - [place], [place] - [address] - [phone]  - [phone]    [address] [address]   Phone: [phone] Fax: [phone]   Hours: Not open 24 hours

## 2022-11-15 NOTE — TELEPHONE ENCOUNTER
Left message to return call to office #[phone]    FOR HUB: Please inform pt he will need to be evaluated in office. Please schedule in office at soonest availability; it is otherwise recommended to be seen in [facility] for evaluation

## 2022-11-16 ENCOUNTER — TELEMEDICINE (OUTPATIENT)
Dept: FAMILY MEDICINE CLINIC | Facility: CLINIC | Age: 57
End: 2022-11-16

## 2022-11-16 DIAGNOSIS — J02.9 PHARYNGITIS, UNSPECIFIED ETIOLOGY: Primary | ICD-10-CM

## 2022-11-16 DIAGNOSIS — R05.1 ACUTE COUGH: Primary | ICD-10-CM

## 2022-11-16 DIAGNOSIS — R05.1 ACUTE COUGH: ICD-10-CM

## 2022-11-16 PROCEDURE — 99213 OFFICE O/P EST LOW 20 MIN: CPT | Performed by: PHYSICIAN ASSISTANT

## 2022-11-16 RX ORDER — CEFDINIR 300 MG/1
300 CAPSULE ORAL 2 TIMES DAILY
Qty: 28 CAPSULE | Refills: 0 | Status: SHIPPED | OUTPATIENT
Start: 2022-11-16 | End: 2023-01-10

## 2022-11-16 RX ORDER — DEXTROMETHORPHAN HYDROBROMIDE AND PROMETHAZINE HYDROCHLORIDE 15; 6.25 MG/5ML; MG/5ML
5 SYRUP ORAL 4 TIMES DAILY PRN
Qty: 118 ML | Refills: 1 | Status: SHIPPED | OUTPATIENT
Start: 2022-11-16 | End: 2022-11-16

## 2022-11-16 NOTE — TELEPHONE ENCOUNTER
Completed visit for cough   HISTORY OF PRESENT ILLNESS  Clemencia Rivera is a 45 y.o. female. HPI  C/o itchy raised rash on left cheek, noticed 4 days ago. Using hydrocortisone cream with improvement. Denies any recent unusual exposures or change in hygiene products. Also has had ulcer of mouth for about 10 days now. Noticed some lymph node swelling in neck, now resolved. Past medical history, social history, family history and medications were reviewed and updated. Blood pressure 112/76, pulse 90, temperature 98 °F (36.7 °C), temperature source Oral, resp. rate 16, height 5' 6\" (1.676 m), weight 137 lb (62.1 kg), last menstrual period 08/21/2019, SpO2 98 %, not currently breastfeeding. Review of Systems   HENT:        Mouth ulcer   Respiratory: Negative. Cardiovascular: Negative. Skin: Positive for itching and rash. All other systems reviewed and are negative. Physical Exam   Constitutional: No distress. HENT:   Right Ear: Tympanic membrane and ear canal normal.   Left Ear: Tympanic membrane and ear canal normal.   Nose: Right sinus exhibits no maxillary sinus tenderness and no frontal sinus tenderness. Left sinus exhibits no maxillary sinus tenderness and no frontal sinus tenderness. Mouth/Throat: Oropharynx is clear and moist and mucous membranes are normal. Oral lesions (left lower gum) present. No dental caries. Neck: Neck supple. Cardiovascular: Normal rate, regular rhythm and normal heart sounds. Pulmonary/Chest: Effort normal and breath sounds normal.   Lymphadenopathy:     She has no cervical adenopathy. Skin: Skin is warm and dry. Fading erythematous plaque left cheek. Trace edema of cheek. ASSESSMENT and PLAN  Diagnoses and all orders for this visit:    1. Aphthous ulcer of mouth  -     triamcinolone acetonide (KENALOG) 0.1 % dental paste; Apply bid to affected area. Advised antibacterial mouth rinse, she declined. Trial dental paste as directed.   Salt and water swish bid.  May use ibuprofen prn discomfort. 2. Dermatitis  Resolving with cortisone cream.  Continue with thin layer bid x 4-5 days. Follow up prn if sx worsen or FTI.

## 2022-11-16 NOTE — PROGRESS NOTES
"Subjective   Stephan Bass is a 57 y.o. male  Cough      History of Present Illness  Patient presents and consents for telehealth/video visit examination.  Patient presents today for virtual visit from his home while I am at my office.  He states that he feels that he is getting a sore throat and cough similar to what he is typically come down with a couple of times a year for the last several years.  He is not running a fever at all.  He states he has a sore throat and tenderness in the glands in his neck.  He has been having \"convulsive coughs\".  He states he is prone to excessive coughing when he gets sick.  He has done well in the past taking Tussionex at night and Promethazine DM during the daytime.  He denies any significant sinus pressure, chest pain or shortness of breath except when coughing excessively.  The following portions of the patient's history were reviewed and updated as appropriate: allergies, current medications, past social history and problem list    Review of Systems   Constitutional: Negative.  Negative for fever.   HENT: Positive for congestion and sore throat. Negative for drooling, ear discharge, ear pain and trouble swallowing.    Respiratory: Positive for cough. Negative for shortness of breath and stridor.    Gastrointestinal: Negative for abdominal pain, diarrhea and vomiting.   Musculoskeletal: Negative for neck pain.   Neurological: Negative for headaches.   Hematological: Positive for adenopathy.   Psychiatric/Behavioral: Positive for sleep disturbance.       Objective     There were no vitals filed for this visit.    Physical Exam  Constitutional:       General: He is not in acute distress.     Appearance: Normal appearance. He is well-developed. He is not ill-appearing, toxic-appearing or diaphoretic.   HENT:      Head: Normocephalic and atraumatic.   Eyes:      Conjunctiva/sclera: Conjunctivae normal.   Pulmonary:      Effort: Pulmonary effort is normal. No respiratory " distress.   Skin:     General: Skin is dry.      Coloration: Skin is not pale.      Findings: No erythema or rash.   Neurological:      Mental Status: He is alert and oriented to person, place, and time.      Coordination: Coordination normal.   Psychiatric:         Attention and Perception: He is attentive.         Mood and Affect: Mood normal.         Speech: Speech normal.         Behavior: Behavior normal.         Thought Content: Thought content normal.         Judgment: Judgment normal.         Assessment & Plan     Diagnoses and all orders for this visit:    1. Pharyngitis, unspecified etiology (Primary)    2. Acute cough    Other orders  -     cefdinir (OMNICEF) 300 MG capsule; Take 1 capsule by mouth 2 (Two) Times a Day.  Dispense: 28 capsule; Refill: 0  -     promethazine-dextromethorphan (PROMETHAZINE-DM) 6.25-15 MG/5ML syrup; Take 5 mL by mouth 4 (Four) Times a Day As Needed for Cough. For daytime use only  Dispense: 118 mL; Refill: 1    Will electronically submit a prescription for 60 mL of Tussionex to take 1 teaspoon nightly for cough no refills per Dr. Potts.  He is to take this separate from the Promethazine DM which is to be taken only during the daytime.  Drowsiness warning given.    As part of this patient's treatment plan, patient will be prescribed controlled substances. The patient has been made aware of appropriate use of such medications, including potential risk of somnolence, limited ability to drive and /or work safely, and potential for dependence or overdose. It has also been made clear that these medications are for use by this patient only, without concomitant use of alcohol or other substances unless prescribed.Controlled substance status of medication discussed with patient, discussed risks of medication including abuse potential and diversion potential and need to follow up for reevaluation appointment in order to receive further refills.    Part of this note may be an  electronic transcription/translation of spoken language to printed text using the Dragon Dictation System.    I spent 15 minutes in patient care: Reviewing records prior to the visit, examining the patient, entering orders and documentation    Part of this note may be an electronic transcription/translation of spoken language to printed text using the Dragon Dictation System.      Answers for HPI/ROS submitted by the patient on 11/16/2022  What is the primary reason for your visit?: Sore Throat

## 2022-12-01 DIAGNOSIS — B00.1 RECURRENT COLD SORES: ICD-10-CM

## 2022-12-01 NOTE — TELEPHONE ENCOUNTER
Caller: Stephan Bass    Relationship: Self    Best call back number: [phone]     Requested Prescriptions:   Requested Prescriptions     Pending Prescriptions Disp Refills   • acyclovir (ZOVIRAX) 200 MG capsule 180 capsule 1     Sig: Take 1 capsule by mouth Every 12 (Twelve) Hours.        Pharmacy where request should be sent: [facility]/PHARMACY #[id number] - [place], [place] - [address] - [phone]  - [phone] FX     Additional details provided by patient: PATIENT HAS CALLED REQUESTING A NEW 90 DAY PRESCRIPTION TO BE SENT INTO [facility] [place] ON [address]. PATIENT IS REQUESTING PRESCRIPTION TO BE CHANGED TO 2 CAPSULES BY MOUTH EVERY 12 HOURS TO COVER WHEN HE HAS FLARE UPS. PATIENT STATES WHEN HE HAS FLARE UPS HE IS NEEDING TO DOUBLE UP ON HIS MEDICATION. PATIENT WANTS PRESCRIPTION TO REFLECT 2 CAPSULES EVERY 12 HOURS SO HE DOES NOT RUN SHORT IF HE HAS FLARE UPS.    Does the patient have less than a 3 day supply:  [x] Yes  [] No    Would you like a call back once the refill request has been completed: [x] Yes [] No    If the office needs to give you a call back, can they leave a voicemail: [x] Yes [] No    Yamile Moses   12/01/22 12:24 EST

## 2022-12-04 DIAGNOSIS — B00.1 RECURRENT COLD SORES: ICD-10-CM

## 2022-12-05 RX ORDER — ACYCLOVIR 200 MG/1
CAPSULE ORAL
Qty: 180 CAPSULE | Refills: 1 | Status: SHIPPED | OUTPATIENT
Start: 2022-12-05 | End: 2022-12-06 | Stop reason: SDUPTHER

## 2022-12-05 NOTE — TELEPHONE ENCOUNTER
PATIENT CALLED ASKING FOR AN UPDATE ON THIS REFILL REQUEST. HE STATES THAT HE WILL RUN OUT ON Wednesday. PLEASE CALL PATIENT WHEN THIS MEDICATION HAS BEEN SENT TO THE PHARMACY.

## 2022-12-06 RX ORDER — ACYCLOVIR 200 MG/1
200 CAPSULE ORAL EVERY 12 HOURS
Qty: 180 CAPSULE | Refills: 1 | Status: SHIPPED | OUTPATIENT
Start: 2022-12-06

## 2023-01-10 ENCOUNTER — OFFICE VISIT (OUTPATIENT)
Dept: FAMILY MEDICINE CLINIC | Facility: CLINIC | Age: 58
End: 2023-01-10
Payer: COMMERCIAL

## 2023-01-10 VITALS
WEIGHT: 227 LBS | HEIGHT: 73 IN | DIASTOLIC BLOOD PRESSURE: 84 MMHG | OXYGEN SATURATION: 96 % | BODY MASS INDEX: 30.09 KG/M2 | SYSTOLIC BLOOD PRESSURE: 138 MMHG | TEMPERATURE: 97.2 F

## 2023-01-10 DIAGNOSIS — J45.41 MODERATE PERSISTENT ASTHMA WITH ACUTE EXACERBATION: ICD-10-CM

## 2023-01-10 DIAGNOSIS — R05.9 COUGH, UNSPECIFIED TYPE: Primary | ICD-10-CM

## 2023-01-10 DIAGNOSIS — J40 BRONCHITIS: ICD-10-CM

## 2023-01-10 DIAGNOSIS — R05.1 ACUTE COUGH: ICD-10-CM

## 2023-01-10 LAB
EXPIRATION DATE: NORMAL
FLUAV AG UPPER RESP QL IA.RAPID: NOT DETECTED
FLUBV AG UPPER RESP QL IA.RAPID: NOT DETECTED
INTERNAL CONTROL: NORMAL
Lab: NORMAL
SARS-COV-2 AG UPPER RESP QL IA.RAPID: NORMAL

## 2023-01-10 PROCEDURE — 99214 OFFICE O/P EST MOD 30 MIN: CPT | Performed by: PHYSICIAN ASSISTANT

## 2023-01-10 PROCEDURE — 87428 SARSCOV & INF VIR A&B AG IA: CPT | Performed by: PHYSICIAN ASSISTANT

## 2023-01-10 RX ORDER — CEFDINIR 300 MG/1
300 CAPSULE ORAL 2 TIMES DAILY
Qty: 28 CAPSULE | Refills: 0 | Status: SHIPPED | OUTPATIENT
Start: 2023-01-10

## 2023-01-10 RX ORDER — BENZONATATE 200 MG/1
200 CAPSULE ORAL 3 TIMES DAILY PRN
Qty: 21 CAPSULE | Refills: 0 | Status: SHIPPED | OUTPATIENT
Start: 2023-01-10

## 2023-01-10 RX ORDER — PREDNISONE 20 MG/1
20 TABLET ORAL 2 TIMES DAILY
Qty: 10 TABLET | Refills: 0 | Status: SHIPPED | OUTPATIENT
Start: 2023-01-10 | End: 2023-01-16

## 2023-01-10 NOTE — PROGRESS NOTES
Subjective   Stephan Bass is a 57 y.o. male  Cough (Productive cough with yellowish mucus since Thursday, currently taking Tessalon Perles and req Tussionex )      History of Present Illness  Stephan Bass, 1965, coming in today for evaluation of cough and congestion.     He states his symptoms began on Thursday, 2023, when he was in [place] for a family . He stated his symptoms were extremely dry and he began to experience a sore throat and a slight cough. He states his symptoms are now dropping into his chest and he can hear a gurgling sound when he breathes. He denies any fever. He states he is coughing up stuff but not significant dark phlegm. He states his throat is still sore. He adds his voice is hoarse and his muscles are hurting from coughing. He notes that he has been taking Tessalon Perles during the day and Tylenol day stuff and has been taking NyQuil at night and in addition to that he takes Tussionex. He admits to switching from Advair to Symbicort. He admits to having an albuterol inhaler but states he does not use it because he is not short of breath.    The following portions of the patient's history were reviewed and updated as appropriate: allergies, current medications, past social history and problem list    Review of Systems   Constitutional: Negative for chills, fatigue and fever.   HENT: Positive for congestion.    Respiratory: Positive for cough, chest tightness and wheezing. Negative for shortness of breath.    Cardiovascular: Negative for chest pain.   Gastrointestinal: Negative for nausea.   Psychiatric/Behavioral: Positive for sleep disturbance.       Objective     Vitals:    01/10/23 1507   BP: 138/84   Temp: 97.2 °F (36.2 °C)   SpO2: 96%       Physical Exam  Vitals and nursing note reviewed.   Constitutional:       General: He is not in acute distress.     Appearance: He is well-developed. He is not diaphoretic.   HENT:      Head: Normocephalic and atraumatic.       Nose: Nose normal.   Neck:      Vascular: No JVD.   Cardiovascular:      Rate and Rhythm: Normal rate and regular rhythm.      Heart sounds: Normal heart sounds. No murmur heard.  Pulmonary:      Effort: Pulmonary effort is normal. No respiratory distress.      Breath sounds: No stridor. Wheezing present.   Musculoskeletal:      Cervical back: Neck supple.   Lymphadenopathy:      Cervical: No cervical adenopathy.         Assessment & Plan     Diagnoses and all orders for this visit:    1. Cough, unspecified type (Primary)  -     POCT SARS-CoV-2 Antigen TUSHAR + Flu    2. Bronchitis    3. Moderate persistent asthma with acute exacerbation    Other orders  -     cefdinir (OMNICEF) 300 MG capsule; Take 1 capsule by mouth 2 (Two) Times a Day.  Dispense: 28 capsule; Refill: 0  -     predniSONE (DELTASONE) 20 MG tablet; Take 1 tablet by mouth 2 (Two) Times a Day.  Dispense: 10 tablet; Refill: 0  -     benzonatate (TESSALON) 200 MG capsule; Take 1 capsule by mouth 3 (Three) Times a Day As Needed for Cough.  Dispense: 21 capsule; Refill: 0  -     Chlorcyclizine-Pseudoephed 25-60 MG tablet; Take 1/2 to 1 every 12 hours as needed for congestion  Dispense: 20 tablet; Refill: 0    Will electronically submit a prescription for Tussionex 60 mL 1 teaspoon nightly for cough per Dr. Potts.    1. Cough and congestion.  - The patient's influenza and COVID-19 tests are negative.  - I will prescribe Omnicef.  - I advised the patient to add a puff of inhaler to his regimen.    As part of this patient's treatment plan, patient will be prescribed controlled substances. The patient has been made aware of appropriate use of such medications, including potential risk of somnolence, limited ability to drive and /or work safely, and potential for dependence or overdose. It has also been made clear that these medications are for use by this patient only, without concomitant use of alcohol or other substances unless prescribed.Controlled  substance status of medication discussed with patient, discussed risks of medication including abuse potential and diversion potential and need to follow up for reevaluation appointment in order to receive further refills.    Part of this note may be an electronic transcription/translation of spoken language to printed text using the Dragon Dictation System.      Transcribed from ambient dictation for Rosana Chery PA-C by Debi Welsh.  01/10/23   16:26 EST    Patient or patient representative verbalized consent to the visit recording.  I have personally performed the services described in this document as transcribed by the above individual, and it is both accurate and complete.  oRsana Chery PA-C  1/10/2023  16:38 EST

## 2023-01-16 ENCOUNTER — OFFICE VISIT (OUTPATIENT)
Dept: FAMILY MEDICINE CLINIC | Facility: CLINIC | Age: 58
End: 2023-01-16
Payer: COMMERCIAL

## 2023-01-16 ENCOUNTER — HOSPITAL ENCOUNTER (OUTPATIENT)
Dept: GENERAL RADIOLOGY | Facility: HOSPITAL | Age: 58
Discharge: HOME | End: 2023-01-16
Admitting: PHYSICIAN ASSISTANT
Payer: COMMERCIAL

## 2023-01-16 VITALS
SYSTOLIC BLOOD PRESSURE: 140 MMHG | BODY MASS INDEX: 31.28 KG/M2 | WEIGHT: 236 LBS | DIASTOLIC BLOOD PRESSURE: 78 MMHG | HEART RATE: 78 BPM | OXYGEN SATURATION: 98 % | TEMPERATURE: 97.2 F | HEIGHT: 73 IN

## 2023-01-16 DIAGNOSIS — R05.1 ACUTE COUGH: ICD-10-CM

## 2023-01-16 DIAGNOSIS — J40 BRONCHITIS: ICD-10-CM

## 2023-01-16 DIAGNOSIS — R05.1 ACUTE COUGH: Primary | ICD-10-CM

## 2023-01-16 DIAGNOSIS — J45.41 MODERATE PERSISTENT ASTHMA WITH ACUTE EXACERBATION: ICD-10-CM

## 2023-01-16 PROCEDURE — 71046 X-RAY EXAM CHEST 2 VIEWS: CPT

## 2023-01-16 PROCEDURE — 96372 THER/PROPH/DIAG INJ SC/IM: CPT | Performed by: PHYSICIAN ASSISTANT

## 2023-01-16 PROCEDURE — 99213 OFFICE O/P EST LOW 20 MIN: CPT | Performed by: PHYSICIAN ASSISTANT

## 2023-01-16 RX ORDER — CHLORCYCLIZINE HYDROCHLORIDE AND PSEUDOEPHEDRINE HYDROCHLORIDE 25; 60 MG/1; MG/1
TABLET ORAL
COMMUNITY
Start: 2023-01-10

## 2023-01-16 RX ORDER — PREDNISONE 20 MG/1
20 TABLET ORAL 2 TIMES DAILY
Qty: 10 TABLET | Refills: 0 | Status: SHIPPED | OUTPATIENT
Start: 2023-01-16

## 2023-01-16 RX ORDER — CEFTRIAXONE 500 MG/1
500 INJECTION, POWDER, FOR SOLUTION INTRAMUSCULAR; INTRAVENOUS ONCE
Status: COMPLETED | OUTPATIENT
Start: 2023-01-16 | End: 2023-01-16

## 2023-01-16 RX ORDER — CEFTRIAXONE 500 MG/1
500 INJECTION, POWDER, FOR SOLUTION INTRAMUSCULAR; INTRAVENOUS ONCE
Qty: 1 EACH | Refills: 0 | Status: SHIPPED | OUTPATIENT
Start: 2023-01-16 | End: 2023-01-16

## 2023-01-16 RX ADMIN — CEFTRIAXONE 500 MG: 500 INJECTION, POWDER, FOR SOLUTION INTRAMUSCULAR; INTRAVENOUS at 15:41

## 2023-01-26 ENCOUNTER — TELEPHONE (OUTPATIENT)
Dept: FAMILY MEDICINE CLINIC | Facility: CLINIC | Age: 58
End: 2023-01-26
Payer: COMMERCIAL

## 2023-01-26 NOTE — TELEPHONE ENCOUNTER
Caller: Stephan Bass    Relationship: Self    Best call back number: cefdinir (OMNICEF) 300 MG capsule    Requested Prescriptions:   cefdinir (OMNICEF) 300 MG capsule    Pharmacy where request should be sent: [facility]/PHARMACY #[id number] - [place], [place] - [address] - [phone]  - [phone]      Additional details provided by patient: PATIENT AND PHARMACY REQUESTING TO EXTEND MEDICATION DUE TO PERSISTENT ILLNESS    Does the patient have less than a 3 day supply:  [x] Yes  [] No    Would you like a call back once the refill request has been completed: [x] Yes [] No    If the office needs to give you a call back, can they leave a voicemail: [x] Yes [] No    Efraín Foster, PCT   01/26/23 09:20 EST

## 2023-01-30 NOTE — TELEPHONE ENCOUNTER
+ If he finished his antibiotic and all of his symptoms of his bronchitis completely resolved and he just woke up this morning with a sore throat it is likely to be viral I would recommend taking Tylenol or ibuprofen and if he is having congestion using an over-the-counter antihistamine and if symptoms get worse come in to be checked for strep

## 2023-06-13 DIAGNOSIS — F41.1 GENERALIZED ANXIETY DISORDER: ICD-10-CM

## 2023-06-13 RX ORDER — DESVENLAFAXINE SUCCINATE 50 MG/1
TABLET, EXTENDED RELEASE ORAL
Qty: 90 TABLET | Refills: 3 | Status: SHIPPED | OUTPATIENT
Start: 2023-06-13

## 2023-08-10 DIAGNOSIS — B00.1 RECURRENT COLD SORES: ICD-10-CM

## 2023-08-11 RX ORDER — ACYCLOVIR 200 MG/1
CAPSULE ORAL
Qty: 180 CAPSULE | Refills: 0 | Status: SHIPPED | OUTPATIENT
Start: 2023-08-11

## 2023-09-01 ENCOUNTER — TELEPHONE (OUTPATIENT)
Dept: FAMILY MEDICINE CLINIC | Facility: CLINIC | Age: 58
End: 2023-09-01
Payer: COMMERCIAL

## 2023-09-01 NOTE — TELEPHONE ENCOUNTER
Pt calling to be seen with bad cough and sore throat. Told him to go to urgent care since there are no openings today. Requesting meds be called in. Please advise

## 2023-09-26 DIAGNOSIS — B00.1 RECURRENT COLD SORES: ICD-10-CM

## 2023-09-26 RX ORDER — ACYCLOVIR 200 MG/1
CAPSULE ORAL
Qty: 180 CAPSULE | Refills: 1 | Status: SHIPPED | OUTPATIENT
Start: 2023-09-26

## 2023-10-09 NOTE — PROGRESS NOTES
"Subjective   Stephan Bass is a 57 y.o. male  Bronchitis (6 day follow up on bronchitis, productive cough with yellow mucus /)      History of Present Illness    Slim Bass, 1965, coming in for 6-day follow-up on bronchitis. Please see the office notes from last week.    The patient notes that he is a little bit better, but not much. He admits his cough is about 20 percent better. He confirms \"a lot of noises\" in there. He notes he is finished with prednisone. He notes his muscles hurt from coughing contractions. He notes that he has 7 days left of Omnicef. He admits to sleeping sitting up at almost 90 degrees, which is uncomfortable. The patient admits to constantly \"blowing his nose,\" that is clear.    He admits his wife feels like she is starting to get sick.    The following portions of the patient's history were reviewed and updated as appropriate: allergies, current medications, past social history and problem list    Review of Systems   Constitutional: Negative for chills, fatigue and fever.   HENT: Positive for congestion.    Respiratory: Positive for cough, chest tightness and wheezing. Negative for shortness of breath.    Cardiovascular: Negative for chest pain.   Gastrointestinal: Negative for nausea.       Objective     Vitals:    01/16/23 1518   BP: 140/78   Pulse: 78   Temp: 97.2 °F (36.2 °C)   SpO2: 98%       Physical Exam  Vitals and nursing note reviewed.   Constitutional:       General: He is not in acute distress.     Appearance: He is well-developed. He is not diaphoretic.   HENT:      Head: Normocephalic and atraumatic.      Nose: Nose normal.   Neck:      Vascular: No JVD.   Cardiovascular:      Rate and Rhythm: Normal rate and regular rhythm.      Heart sounds: Normal heart sounds. No murmur heard.  Pulmonary:      Effort: Pulmonary effort is normal. No respiratory distress.      Breath sounds: No stridor. Wheezing and rales ( Right lower lobe) present.   Musculoskeletal:      Cervical " back: Neck supple.   Lymphadenopathy:      Cervical: No cervical adenopathy.         Assessment & Plan     Diagnoses and all orders for this visit:    1. Acute cough (Primary)  -     XR Chest PA & Lateral; Future    2. Moderate persistent asthma with acute exacerbation    3. Bronchitis    Other orders  -     cefTRIAXone (ROCEPHIN) 500 MG injection; Inject 500 mg into the appropriate muscle as directed by prescriber 1 (One) Time for 1 dose.  Dispense: 1 each; Refill: 0  -     predniSONE (DELTASONE) 20 MG tablet; Take 1 tablet by mouth 2 (Two) Times a Day.  Dispense: 10 tablet; Refill: 0    1. Bronchitis  - The patient will receive a Rocephin injection today.  - I will send in a prescription for a couple of days of prednisone.  - I will call the patient tomorrow to see if I need to extend the cefdinir longer.    Transcribed from ambient dictation for Rosana Chery PA-C by Debi Welsh.  01/16/23   16:59 EST    Patient or patient representative verbalized consent to the visit recording.  I have personally performed the services described in this document as transcribed by the above individual, and it is both accurate and complete.  Rosana Chery PA-C  1/18/2023  12:26 EST       Patient informed/Family informed

## 2023-11-29 ENCOUNTER — OFFICE VISIT (OUTPATIENT)
Dept: FAMILY MEDICINE CLINIC | Facility: CLINIC | Age: 58
End: 2023-11-29
Payer: COMMERCIAL

## 2023-11-29 VITALS
WEIGHT: 219.8 LBS | SYSTOLIC BLOOD PRESSURE: 126 MMHG | HEART RATE: 65 BPM | RESPIRATION RATE: 14 BRPM | BODY MASS INDEX: 29.13 KG/M2 | HEIGHT: 73 IN | DIASTOLIC BLOOD PRESSURE: 80 MMHG | OXYGEN SATURATION: 99 %

## 2023-11-29 DIAGNOSIS — K64.8 INTERNAL HEMORRHOIDS: Primary | ICD-10-CM

## 2023-11-29 DIAGNOSIS — N52.9 ERECTILE DYSFUNCTION, UNSPECIFIED ERECTILE DYSFUNCTION TYPE: ICD-10-CM

## 2023-11-29 DIAGNOSIS — M54.50 CHRONIC BILATERAL LOW BACK PAIN WITHOUT SCIATICA: ICD-10-CM

## 2023-11-29 DIAGNOSIS — G89.29 CHRONIC BILATERAL LOW BACK PAIN WITHOUT SCIATICA: ICD-10-CM

## 2023-11-29 PROCEDURE — 99214 OFFICE O/P EST MOD 30 MIN: CPT | Performed by: PHYSICIAN ASSISTANT

## 2023-11-29 RX ORDER — TADALAFIL 5 MG
5 TABLET ORAL DAILY
Qty: 90 TABLET | Refills: 3 | Status: SHIPPED | OUTPATIENT
Start: 2023-11-29

## 2023-11-29 RX ORDER — HYDROCORTISONE ACETATE 25 MG/1
25 SUPPOSITORY RECTAL 2 TIMES DAILY PRN
Qty: 12 SUPPOSITORY | Refills: 3 | Status: SHIPPED | OUTPATIENT
Start: 2023-11-29 | End: 2023-11-29 | Stop reason: SDUPTHER

## 2023-11-29 RX ORDER — HYDROCORTISONE ACETATE 25 MG/1
25 SUPPOSITORY RECTAL 2 TIMES DAILY PRN
Qty: 12 SUPPOSITORY | Refills: 3 | Status: SHIPPED | OUTPATIENT
Start: 2023-11-29

## 2023-11-29 RX ORDER — CYCLOBENZAPRINE HCL 10 MG
10 TABLET ORAL 3 TIMES DAILY PRN
Qty: 30 TABLET | Refills: 1 | Status: SHIPPED | OUTPATIENT
Start: 2023-11-29

## 2023-11-29 NOTE — PROGRESS NOTES
Subjective   Stephan Bass is a 58 y.o. male  Back Pain (Dull pain in Lt low back x6-9 months, occurs intermittently after urination. ), Hemorrhoids, and Erectile Dysfunction (RF tadalafil-[facility])      History of Present Illness  Patient is a pleasant 58-year-old white male who comes in with more problems first is low back pain and stiffness for the last 6 to 9 months also having increased urinary frequency ED issues.  Patient states he has trouble stopping starting urination patient has pain in lower back uncomfortable when laying down pain with twisting.  Patient also has a flareup of internal hemorrhoids has these in the past well in the past  The following portions of the patient's history were reviewed and updated as appropriate: allergies, current medications, past social history and problem list    Review of Systems   Constitutional:  Negative for fatigue and unexpected weight change.   Respiratory:  Negative for cough, chest tightness and shortness of breath.    Cardiovascular:  Negative for chest pain, palpitations and leg swelling.   Gastrointestinal:  Negative for nausea.   Genitourinary:  Positive for frequency.   Musculoskeletal:  Positive for back pain.   Skin:  Negative for color change and rash.   Neurological:  Negative for dizziness, syncope, weakness and headaches.       Objective     Vitals:    11/29/23 1150   BP: 126/80   Pulse: 65   Resp: 14   SpO2: 99%       Physical Exam  Vitals and nursing note reviewed.   Constitutional:       General: He is not in acute distress.     Appearance: Normal appearance. He is well-developed. He is not ill-appearing, toxic-appearing or diaphoretic.   Neck:      Vascular: No carotid bruit or JVD.   Cardiovascular:      Rate and Rhythm: Normal rate and regular rhythm.      Pulses: Normal pulses.      Heart sounds: Normal heart sounds. No murmur heard.  Pulmonary:      Effort: Pulmonary effort is normal. No respiratory distress.      Breath sounds: Normal  breath sounds.   Abdominal:      Palpations: Abdomen is soft.      Tenderness: There is no abdominal tenderness.   Skin:     General: Skin is warm and dry.   Neurological:      Mental Status: He is alert.         Assessment & Plan     Diagnoses and all orders for this visit:    1. Internal hemorrhoids (Primary)  -     Discontinue: hydrocortisone (ANUSOL-HC) 25 MG suppository; Insert 1 suppository into the rectum 2 (Two) Times a Day As Needed for Hemorrhoids.  Dispense: 12 suppository; Refill: 3  -     hydrocortisone (ANUSOL-HC) 25 MG suppository; Insert 1 suppository into the rectum 2 (Two) Times a Day As Needed for Hemorrhoids.  Dispense: 12 suppository; Refill: 3    2. Erectile dysfunction, unspecified erectile dysfunction type  -     Cialis 5 MG tablet; Take 1 tablet by mouth Daily.  Dispense: 90 tablet; Refill: 3    3. Chronic bilateral low back pain without sciatica  -     cyclobenzaprine (FLEXERIL) 10 MG tablet; Take 1 tablet by mouth 3 (Three) Times a Day As Needed for Muscle Spasms.  Dispense: 30 tablet; Refill: 1     I spent 15 minutes in patient care: Reviewing records prior to the visit, examining the patient, entering orders and documentation    Part of this note may be an electronic transcription/translation of spoken language to printed text using the Dragon Dictation System.

## 2023-12-04 ENCOUNTER — TELEPHONE (OUTPATIENT)
Dept: FAMILY MEDICINE CLINIC | Facility: CLINIC | Age: 58
End: 2023-12-04

## 2023-12-04 NOTE — TELEPHONE ENCOUNTER
Pharmacy Name: [facility] - SHIVA MASON - [address] [facility] [address] AT PORTAL TO [facility] - [phone]  - [phone]      Reference Number (if applicable): [id number]    Pharmacy representative name: WILMAN    Pharmacy representative phone number: [phone]      What medication are you calling in regards to: Cialis 5 MG tablet     What question does the pharmacy have: WANTS TO KNOW IF THEY CAN DISPENSE GENERIC    Who is the provider that prescribed the medication: SHIRA IRWIN

## 2024-01-29 DIAGNOSIS — B00.1 RECURRENT COLD SORES: ICD-10-CM

## 2024-01-29 RX ORDER — ACYCLOVIR 200 MG/1
200 CAPSULE ORAL EVERY 12 HOURS
Qty: 180 CAPSULE | Refills: 1 | Status: SHIPPED | OUTPATIENT
Start: 2024-01-29

## 2024-01-29 NOTE — TELEPHONE ENCOUNTER
"Caller: Stephan Bass \"Brady\"    Relationship: Self    Best call back number: [phone]     Requested Prescriptions:   Requested Prescriptions     Pending Prescriptions Disp Refills    acyclovir (ZOVIRAX) 200 MG capsule 180 capsule 1     Sig: Take 1 capsule by mouth Every 12 (Twelve) Hours.        Pharmacy where request should be sent: [facility] - SHIVA MASON - [address] [facility] AT [address] TO [facility] - [phone]  - [phone]      Last office visit with prescribing clinician: 11/29/2023   Last telemedicine visit with prescribing clinician: Visit date not found   Next office visit with prescribing clinician: Visit date not found     Additional details provided by patient: THE PATIENT WAS TOLD TO HAVE THE OFFICE FAX IT  [phone]    Does the patient have less than a 3 day supply:  [] Yes  [x] No    Would you like a call back once the refill request has been completed: [x] Yes [] No    If the office needs to give you a call back, can they leave a voicemail: [x] Yes [] No    Yamini Linda Rep   01/29/24 08:55 EST         "

## 2024-02-19 ENCOUNTER — PATIENT ROUNDING (BHMG ONLY) (OUTPATIENT)
Dept: URGENT CARE | Facility: CLINIC | Age: 59
End: 2024-02-19
Payer: COMMERCIAL

## 2024-02-19 NOTE — ED NOTES
Thank you for letting us care for you in your recent visit to our urgent care center. We would love to hear about your experience with us. Was this the first time you have visited our location?    We’re always looking for ways to make our patients’ experiences even better. Do you have any recommendations on ways we may improve?     I appreciate you taking the time to respond. Please be on the lookout for a survey about your recent visit from [facility] via text or email. We would greatly appreciate if you could fill that out and turn it back in. We want your voice to be heard and we value your feedback.   Thank you for choosing [facility] for your healthcare needs.

## 2024-05-31 RX ORDER — DESVENLAFAXINE SUCCINATE 50 MG/1
50 TABLET, EXTENDED RELEASE ORAL DAILY
Qty: 90 TABLET | Refills: 3 | Status: SHIPPED | OUTPATIENT
Start: 2024-05-31

## 2024-10-14 DIAGNOSIS — B00.1 RECURRENT COLD SORES: ICD-10-CM

## 2024-10-14 RX ORDER — ACYCLOVIR 200 MG/1
200 CAPSULE ORAL EVERY 12 HOURS
Qty: 180 CAPSULE | Refills: 0 | Status: SHIPPED | OUTPATIENT
Start: 2024-10-14

## 2024-10-15 ENCOUNTER — OFFICE VISIT (OUTPATIENT)
Dept: FAMILY MEDICINE CLINIC | Facility: CLINIC | Age: 59
End: 2024-10-15
Payer: COMMERCIAL

## 2024-10-15 VITALS
RESPIRATION RATE: 16 BRPM | HEIGHT: 73 IN | WEIGHT: 229.2 LBS | SYSTOLIC BLOOD PRESSURE: 140 MMHG | OXYGEN SATURATION: 97 % | TEMPERATURE: 97.6 F | BODY MASS INDEX: 30.38 KG/M2 | HEART RATE: 70 BPM | DIASTOLIC BLOOD PRESSURE: 76 MMHG

## 2024-10-15 DIAGNOSIS — J01.00 ACUTE NON-RECURRENT MAXILLARY SINUSITIS: Primary | ICD-10-CM

## 2024-10-15 LAB
EXPIRATION DATE: NORMAL
FLUAV AG UPPER RESP QL IA.RAPID: NOT DETECTED
FLUBV AG UPPER RESP QL IA.RAPID: NOT DETECTED
INTERNAL CONTROL: NORMAL
Lab: NORMAL
SARS-COV-2 AG UPPER RESP QL IA.RAPID: NOT DETECTED

## 2024-10-15 PROCEDURE — 87428 SARSCOV & INF VIR A&B AG IA: CPT | Performed by: PHYSICIAN ASSISTANT

## 2024-10-15 PROCEDURE — 99213 OFFICE O/P EST LOW 20 MIN: CPT | Performed by: PHYSICIAN ASSISTANT

## 2024-10-15 RX ORDER — CEFDINIR 300 MG/1
300 CAPSULE ORAL 2 TIMES DAILY
Qty: 20 CAPSULE | Refills: 0 | Status: SHIPPED | OUTPATIENT
Start: 2024-10-15

## 2024-10-15 RX ORDER — PREDNISONE 20 MG/1
20 TABLET ORAL 2 TIMES DAILY
Qty: 14 TABLET | Refills: 0 | Status: SHIPPED | OUTPATIENT
Start: 2024-10-15

## 2024-10-15 RX ORDER — BENZONATATE 100 MG/1
100 CAPSULE ORAL 2 TIMES DAILY
Qty: 20 CAPSULE | Refills: 0 | Status: SHIPPED | OUTPATIENT
Start: 2024-10-15

## 2024-10-15 NOTE — PROGRESS NOTES
Subjective   Stephan Bass is a 59 y.o. male  Cough (Productive cough, PND for several days)      Cough  This is a new problem. The current episode started in the past 7 days. The problem has been worse. The cough is Productive of green sputum. Associated symptoms include ear pain, headaches, postnasal drip and rhinorrhea. Pertinent negatives include no chills, fever, myalgias, sore throat or shortness of breath. He has tried prescription cough suppressant for the symptoms. The treatment provided no relief.     History of Present Illness      The following portions of the patient's history were reviewed and updated as appropriate: allergies, current medications, past social history and problem list    Review of Systems   Constitutional:  Negative for chills, fatigue and fever.   HENT:  Positive for congestion, ear pain, postnasal drip, rhinorrhea and sinus pressure. Negative for sore throat.    Eyes:  Positive for discharge and itching. Negative for pain.   Respiratory:  Positive for cough. Negative for shortness of breath.    Gastrointestinal: Negative.    Genitourinary: Negative.    Musculoskeletal:  Negative for myalgias.   Neurological:  Positive for light-headedness and headaches. Negative for dizziness.   Hematological:  Negative for adenopathy.   Psychiatric/Behavioral:  Positive for sleep disturbance.        Objective     Vitals:    10/15/24 1317   BP: 140/76   Pulse: 70   Resp: 16   Temp: 97.6 °F (36.4 °C)   SpO2: 97%       Physical Exam  Vitals and nursing note reviewed.   Constitutional:       Appearance: He is well-developed.   HENT:      Head: Normocephalic and atraumatic.      Right Ear: Tympanic membrane and ear canal normal.      Left Ear: Tympanic membrane and ear canal normal.      Nose: Mucosal edema and rhinorrhea present.      Right Sinus: Maxillary sinus tenderness and frontal sinus tenderness present.      Left Sinus: Maxillary sinus tenderness and frontal sinus tenderness present.       Mouth/Throat:      Pharynx: No oropharyngeal exudate.   Eyes:      Pupils: Pupils are equal, round, and reactive to light.   Cardiovascular:      Rate and Rhythm: Normal rate and regular rhythm.   Pulmonary:      Effort: Pulmonary effort is normal.      Breath sounds: Normal breath sounds.       Physical Exam      Assessment & Plan   Assessment & Plan      Diagnoses and all orders for this visit:    1. Acute non-recurrent maxillary sinusitis (Primary)  -     POCT SARS-CoV-2 Antigen TUSHAR + Flu  -     cefdinir (OMNICEF) 300 MG capsule; Take 1 capsule by mouth 2 (Two) Times a Day.  Dispense: 20 capsule; Refill: 0  -     predniSONE (DELTASONE) 20 MG tablet; Take 1 tablet by mouth 2 (Two) Times a Day.  Dispense: 14 tablet; Refill: 0  -     benzonatate (Tessalon Perles) 100 MG capsule; Take 1 capsule by mouth 2 (Two) Times a Day.  Dispense: 20 capsule; Refill: 0       I spent 15 minutes in patient care: Reviewing records prior to the visit, examining the patient, entering orders and documentation    Part of this note may be an electronic transcription/translation of spoken language to printed text using the Dragon Dictation System.     Patient or patient representative verbalized consent for the use of Ambient Listening during the visit with  SHIVA Santamaria for chart documentation. 10/15/2024  13:46 EDT

## 2025-01-22 DIAGNOSIS — B00.1 RECURRENT COLD SORES: ICD-10-CM

## 2025-01-22 RX ORDER — ACYCLOVIR 200 MG/1
200 CAPSULE ORAL EVERY 12 HOURS
Qty: 180 CAPSULE | Refills: 0 | Status: SHIPPED | OUTPATIENT
Start: 2025-01-22 | End: 2025-01-23

## 2025-01-22 NOTE — TELEPHONE ENCOUNTER
"    Caller: Stephan Bass \"Jose Antonio\"    Relationship: Self    Best call back number: [phone]     Requested Prescriptions:   Requested Prescriptions     Pending Prescriptions Disp Refills    acyclovir (ZOVIRAX) 200 MG capsule 180 capsule 0     Sig: Take 1 capsule by mouth Every 12 (Twelve) Hours.        Pharmacy where request should be sent: [facility]/PHARMACY #[id number] - [address] [phone] [facility] [phone]      Last office visit with prescribing clinician: 10/15/2024   Last telemedicine visit with prescribing clinician: Visit date not found   Next office visit with prescribing clinician: Visit date not found     Additional details provided by patient: PATIENT IS COMPLETELY OUT    Does the patient have less than a 3 day supply:  [x] Yes  [] No    Would you like a call back once the refill request has been completed: [] Yes [x] No    If the office needs to give you a call back, can they leave a voicemail: [] Yes [x] No    Yamini Josue Rep   01/22/25 09:47 EST         "

## 2025-01-23 DIAGNOSIS — B00.1 RECURRENT COLD SORES: ICD-10-CM

## 2025-01-23 RX ORDER — ACYCLOVIR 200 MG/1
200 CAPSULE ORAL EVERY 12 HOURS
Qty: 180 CAPSULE | Refills: 0 | Status: SHIPPED | OUTPATIENT
Start: 2025-01-23

## 2025-02-04 DIAGNOSIS — B00.1 RECURRENT COLD SORES: ICD-10-CM

## 2025-02-04 RX ORDER — ACYCLOVIR 200 MG/1
200 CAPSULE ORAL EVERY 12 HOURS
Qty: 180 CAPSULE | Refills: 1 | Status: SHIPPED | OUTPATIENT
Start: 2025-02-04

## 2025-05-19 RX ORDER — DESVENLAFAXINE 50 MG/1
50 TABLET, FILM COATED, EXTENDED RELEASE ORAL DAILY
Qty: 90 TABLET | Refills: 3 | Status: SHIPPED | OUTPATIENT
Start: 2025-05-19